# Patient Record
Sex: FEMALE | Race: WHITE | Employment: OTHER | URBAN - NONMETROPOLITAN AREA
[De-identification: names, ages, dates, MRNs, and addresses within clinical notes are randomized per-mention and may not be internally consistent; named-entity substitution may affect disease eponyms.]

---

## 2017-02-23 ENCOUNTER — OFFICE VISIT (OUTPATIENT)
Dept: FAMILY MEDICINE CLINIC | Age: 52
End: 2017-02-23

## 2017-02-23 ENCOUNTER — HOSPITAL ENCOUNTER (OUTPATIENT)
Dept: CT IMAGING | Age: 52
Discharge: HOME | End: 2017-02-23
Payer: COMMERCIAL

## 2017-02-23 VITALS
BODY MASS INDEX: 20.35 KG/M2 | HEART RATE: 80 BPM | WEIGHT: 137.4 LBS | OXYGEN SATURATION: 98 % | SYSTOLIC BLOOD PRESSURE: 142 MMHG | DIASTOLIC BLOOD PRESSURE: 110 MMHG | TEMPERATURE: 98.3 F | HEIGHT: 69 IN

## 2017-02-23 VITALS — WEIGHT: 135 LBS | HEIGHT: 69 IN | BODY MASS INDEX: 19.99 KG/M2

## 2017-02-23 DIAGNOSIS — R10.32 LEFT LOWER QUADRANT PAIN: ICD-10-CM

## 2017-02-23 DIAGNOSIS — Z12.11 COLON CANCER SCREENING: ICD-10-CM

## 2017-02-23 DIAGNOSIS — R10.32 LEFT LOWER QUADRANT PAIN: Primary | ICD-10-CM

## 2017-02-23 DIAGNOSIS — N20.0 KIDNEY STONE ON LEFT SIDE: ICD-10-CM

## 2017-02-23 DIAGNOSIS — Z12.31 SCREENING MAMMOGRAM, ENCOUNTER FOR: ICD-10-CM

## 2017-02-23 LAB
AMYLASE: 105 U/L (ref 28–100)
BASOPHILS ABSOLUTE: 0 K/UL (ref 0–0.2)
BASOPHILS RELATIVE PERCENT: 0.4 %
BILIRUBIN URINE: NEGATIVE
BLOOD, URINE: ABNORMAL
CHOLESTEROL, TOTAL: 225 MG/DL (ref 0–199)
CLARITY: CLEAR
COLOR: YELLOW
EOSINOPHILS ABSOLUTE: 0 K/UL (ref 0–0.7)
EOSINOPHILS RELATIVE PERCENT: 0.1 %
EPITHELIAL CELLS, UA: ABNORMAL /HPF
GLUCOSE URINE: NEGATIVE MG/DL
HCT VFR BLD CALC: 37.9 % (ref 37–47)
HDLC SERPL-MCNC: 92 MG/DL (ref 40–59)
HEMOGLOBIN: 12.9 G/DL (ref 12–16)
KETONES, URINE: NEGATIVE MG/DL
LDL CHOLESTEROL CALCULATED: 119 MG/DL (ref 0–129)
LEUKOCYTE ESTERASE, URINE: NEGATIVE
LIPASE: 38 U/L (ref 13–60)
LYMPHOCYTES ABSOLUTE: 0.8 K/UL (ref 1–4.8)
LYMPHOCYTES RELATIVE PERCENT: 7.8 %
MCH RBC QN AUTO: 30.7 PG (ref 27–31.3)
MCHC RBC AUTO-ENTMCNC: 34.1 % (ref 33–37)
MCV RBC AUTO: 90.3 FL (ref 82–100)
MONOCYTES ABSOLUTE: 1.1 K/UL (ref 0.2–0.8)
MONOCYTES RELATIVE PERCENT: 10.4 %
MUCUS: PRESENT
NEUTROPHILS ABSOLUTE: 8.3 K/UL (ref 1.4–6.5)
NEUTROPHILS RELATIVE PERCENT: 81.3 %
NITRITE, URINE: NEGATIVE
PDW BLD-RTO: 13.3 % (ref 11.5–14.5)
PH UA: 5.5 (ref 5–9)
PLATELET # BLD: 184 K/UL (ref 130–400)
PROTEIN UA: 30 MG/DL
RBC # BLD: 4.2 M/UL (ref 4.2–5.4)
RBC UA: ABNORMAL /HPF (ref 0–2)
SPECIFIC GRAVITY UA: 1.02 (ref 1–1.03)
TRIGL SERPL-MCNC: 71 MG/DL (ref 0–200)
UROBILINOGEN, URINE: 0.2 E.U./DL
WBC # BLD: 10.2 K/UL (ref 4.8–10.8)
WBC UA: ABNORMAL /HPF (ref 0–5)

## 2017-02-23 PROCEDURE — 74177 CT ABD & PELVIS W/CONTRAST: CPT

## 2017-02-23 PROCEDURE — 6360000004 HC RX CONTRAST MEDICATION: Performed by: RADIOLOGY

## 2017-02-23 PROCEDURE — 99214 OFFICE O/P EST MOD 30 MIN: CPT | Performed by: FAMILY MEDICINE

## 2017-02-23 RX ORDER — SODIUM CHLORIDE 0.9 % (FLUSH) 0.9 %
10 SYRINGE (ML) INJECTION
Status: DISPENSED | OUTPATIENT
Start: 2017-02-23 | End: 2017-02-23

## 2017-02-23 RX ORDER — OXYCODONE HYDROCHLORIDE AND ACETAMINOPHEN 5; 325 MG/1; MG/1
1 TABLET ORAL EVERY 6 HOURS PRN
Qty: 30 TABLET | Refills: 0 | Status: SHIPPED | OUTPATIENT
Start: 2017-02-23 | End: 2017-03-02

## 2017-02-23 RX ORDER — ONDANSETRON 4 MG/1
4 TABLET, ORALLY DISINTEGRATING ORAL EVERY 8 HOURS PRN
Qty: 15 TABLET | Refills: 0 | Status: SHIPPED | OUTPATIENT
Start: 2017-02-23 | End: 2021-07-06 | Stop reason: CLARIF

## 2017-02-23 RX ORDER — TAMSULOSIN HYDROCHLORIDE 0.4 MG/1
0.4 CAPSULE ORAL DAILY
Qty: 30 CAPSULE | Refills: 0 | Status: SHIPPED | OUTPATIENT
Start: 2017-02-23 | End: 2021-07-06 | Stop reason: CLARIF

## 2017-02-23 RX ADMIN — IOPAMIDOL 100 ML: 755 INJECTION, SOLUTION INTRAVENOUS at 13:40

## 2017-02-23 ASSESSMENT — ENCOUNTER SYMPTOMS
NAUSEA: 1
VOMITING: 0
CONSTIPATION: 0
HEMATOCHEZIA: 0
DIARRHEA: 0
FLATUS: 0
BELCHING: 1

## 2017-03-01 ENCOUNTER — TELEPHONE (OUTPATIENT)
Dept: FAMILY MEDICINE CLINIC | Age: 52
End: 2017-03-01

## 2017-03-07 ENCOUNTER — OFFICE VISIT (OUTPATIENT)
Dept: UROLOGY | Age: 52
End: 2017-03-07

## 2017-03-07 VITALS
BODY MASS INDEX: 19.7 KG/M2 | HEART RATE: 76 BPM | SYSTOLIC BLOOD PRESSURE: 120 MMHG | WEIGHT: 133 LBS | HEIGHT: 69 IN | DIASTOLIC BLOOD PRESSURE: 78 MMHG

## 2017-03-07 DIAGNOSIS — N20.0 KIDNEY STONES: ICD-10-CM

## 2017-03-07 DIAGNOSIS — N20.0 KIDNEY STONES: Primary | ICD-10-CM

## 2017-03-07 LAB
BILIRUBIN, POC: ABNORMAL
BLOOD URINE, POC: ABNORMAL
CLARITY, POC: CLEAR
COLOR, POC: YELLOW
GLUCOSE URINE, POC: ABNORMAL
KETONES, POC: ABNORMAL
LEUKOCYTE EST, POC: ABNORMAL
NITRITE, POC: ABNORMAL
PH, POC: 5.5
PROTEIN, POC: 30
SPECIFIC GRAVITY, POC: 1.01
UROBILINOGEN, POC: 0.2

## 2017-03-07 PROCEDURE — 81003 URINALYSIS AUTO W/O SCOPE: CPT | Performed by: UROLOGY

## 2017-03-07 PROCEDURE — 99243 OFF/OP CNSLTJ NEW/EST LOW 30: CPT | Performed by: UROLOGY

## 2017-03-09 ENCOUNTER — TELEPHONE (OUTPATIENT)
Dept: FAMILY MEDICINE CLINIC | Age: 52
End: 2017-03-09

## 2017-03-09 LAB
CALCIUM SERPL-MCNC: 9.6 MG/DL (ref 8.4–10.2)
PARATHYROID HORMONE INTACT: 98 PG/ML (ref 15–65)

## 2017-03-16 ENCOUNTER — TELEPHONE (OUTPATIENT)
Dept: UROLOGY | Age: 52
End: 2017-03-16

## 2017-03-17 LAB
CALCIUM 24 HOUR URINE: 43 MG/D
CALCIUM URINE: 1.9 MG/DL
CITRIC ACID, U, 24HR: 250 MG/D (ref 320–1240)
CITRIC ACID, URINE: 110 MG/L
CREATININE 24 HOUR URINE: 842 MG/D (ref 500–1400)
CREATININE URINE: 37 MG/DL
HOURS COLLECTED: 24 HR
OXALATES, URINE 24HR: 20 MG/D (ref 4–31)
OXALATES, URINE: 9 MG/L
URIC ACID 24 HOUR URINE: 323 MG/D (ref 250–750)
URIC ACID, UR: 14.2 MG/DL
URINE TOTAL VOLUME: 2275 ML

## 2017-03-21 ENCOUNTER — TELEPHONE (OUTPATIENT)
Dept: UROLOGY | Age: 52
End: 2017-03-21

## 2017-03-28 ENCOUNTER — OFFICE VISIT (OUTPATIENT)
Dept: UROLOGY | Age: 52
End: 2017-03-28

## 2017-03-28 VITALS
HEIGHT: 69 IN | DIASTOLIC BLOOD PRESSURE: 80 MMHG | SYSTOLIC BLOOD PRESSURE: 115 MMHG | BODY MASS INDEX: 19.26 KG/M2 | HEART RATE: 64 BPM | WEIGHT: 130 LBS

## 2017-03-28 DIAGNOSIS — N20.0 RENAL CALCULI: Primary | ICD-10-CM

## 2017-03-28 PROCEDURE — 99214 OFFICE O/P EST MOD 30 MIN: CPT | Performed by: UROLOGY

## 2017-03-31 ENCOUNTER — HOSPITAL ENCOUNTER (OUTPATIENT)
Dept: GENERAL RADIOLOGY | Age: 52
Discharge: HOME | End: 2017-03-31
Payer: COMMERCIAL

## 2017-03-31 ENCOUNTER — HOSPITAL ENCOUNTER (OUTPATIENT)
Dept: CT IMAGING | Age: 52
Discharge: HOME | End: 2017-03-31
Payer: COMMERCIAL

## 2017-03-31 VITALS
HEART RATE: 72 BPM | HEIGHT: 69 IN | SYSTOLIC BLOOD PRESSURE: 111 MMHG | DIASTOLIC BLOOD PRESSURE: 75 MMHG | WEIGHT: 130 LBS | RESPIRATION RATE: 16 BRPM | BODY MASS INDEX: 19.26 KG/M2

## 2017-03-31 DIAGNOSIS — N20.0 RENAL CALCULI: ICD-10-CM

## 2017-03-31 PROCEDURE — 74000 XR ABDOMEN LIMITED (KUB): CPT

## 2017-03-31 PROCEDURE — 74150 CT ABDOMEN W/O CONTRAST: CPT

## 2017-04-04 ENCOUNTER — TELEPHONE (OUTPATIENT)
Dept: UROLOGY | Age: 52
End: 2017-04-04

## 2017-04-14 ENCOUNTER — OFFICE VISIT (OUTPATIENT)
Dept: SURGERY | Age: 52
End: 2017-04-14

## 2017-04-14 VITALS
HEIGHT: 69 IN | HEART RATE: 72 BPM | BODY MASS INDEX: 19.7 KG/M2 | WEIGHT: 133 LBS | SYSTOLIC BLOOD PRESSURE: 112 MMHG | DIASTOLIC BLOOD PRESSURE: 78 MMHG

## 2017-04-14 DIAGNOSIS — E21.3 HYPERPARATHYROIDISM (HCC): ICD-10-CM

## 2017-04-14 DIAGNOSIS — E55.9 VITAMIN D DEFICIENCY: ICD-10-CM

## 2017-04-14 DIAGNOSIS — E21.1 HYPERPARATHYROIDISM DUE TO VITAMIN D DEFICIENCY (HCC): ICD-10-CM

## 2017-04-14 DIAGNOSIS — E21.3 HYPERPARATHYROIDISM (HCC): Primary | ICD-10-CM

## 2017-04-14 LAB
ANION GAP SERPL CALCULATED.3IONS-SCNC: 13 MEQ/L (ref 7–13)
BUN BLDV-MCNC: 30 MG/DL (ref 6–20)
CALCIUM SERPL-MCNC: 9.5 MG/DL (ref 8.6–10.2)
CHLORIDE BLD-SCNC: 102 MEQ/L (ref 98–107)
CO2: 23 MEQ/L (ref 22–29)
CREAT SERPL-MCNC: 1.1 MG/DL (ref 0.5–0.9)
GFR AFRICAN AMERICAN: >60
GFR NON-AFRICAN AMERICAN: 51.7
GLUCOSE BLD-MCNC: 108 MG/DL (ref 74–109)
PARATHYROID HORMONE INTACT: 111.5 PG/ML (ref 15–65)
POTASSIUM SERPL-SCNC: 3.7 MEQ/L (ref 3.5–5.1)
SODIUM BLD-SCNC: 138 MEQ/L (ref 132–144)
VITAMIN D 25-HYDROXY: 24.3 NG/ML (ref 30–100)

## 2017-04-14 PROCEDURE — 99202 OFFICE O/P NEW SF 15 MIN: CPT | Performed by: INTERNAL MEDICINE

## 2017-06-01 ENCOUNTER — TELEPHONE (OUTPATIENT)
Dept: UROLOGY | Age: 52
End: 2017-06-01

## 2017-06-01 DIAGNOSIS — N20.0 KIDNEY STONE ON LEFT SIDE: Primary | ICD-10-CM

## 2019-04-10 ENCOUNTER — HOSPITAL ENCOUNTER (OUTPATIENT)
Dept: PHYSICAL THERAPY | Age: 54
Setting detail: THERAPIES SERIES
Discharge: HOME | End: 2019-04-10
Payer: COMMERCIAL

## 2019-04-10 PROCEDURE — 97162 PT EVAL MOD COMPLEX 30 MIN: CPT

## 2019-04-10 PROCEDURE — 97110 THERAPEUTIC EXERCISES: CPT

## 2019-04-10 PROCEDURE — 97140 MANUAL THERAPY 1/> REGIONS: CPT

## 2019-04-10 ASSESSMENT — PAIN DESCRIPTION - FREQUENCY: FREQUENCY: INTERMITTENT

## 2019-04-10 ASSESSMENT — PAIN DESCRIPTION - PROGRESSION: CLINICAL_PROGRESSION: GRADUALLY WORSENING

## 2019-04-10 ASSESSMENT — PAIN DESCRIPTION - ONSET: ONSET: PROGRESSIVE

## 2019-04-10 ASSESSMENT — PAIN - FUNCTIONAL ASSESSMENT: PAIN_FUNCTIONAL_ASSESSMENT: PREVENTS OR INTERFERES WITH MANY ACTIVE NOT PASSIVE ACTIVITIES

## 2019-04-10 ASSESSMENT — PAIN DESCRIPTION - DESCRIPTORS: DESCRIPTORS: ACHING

## 2019-04-10 ASSESSMENT — PAIN DESCRIPTION - PAIN TYPE: TYPE: CHRONIC PAIN

## 2019-04-10 ASSESSMENT — PAIN DESCRIPTION - LOCATION: LOCATION: BACK;LEG;BUTTOCKS

## 2019-04-10 ASSESSMENT — PAIN SCALES - GENERAL: PAINLEVEL_OUTOF10: 5

## 2019-04-10 ASSESSMENT — PAIN DESCRIPTION - ORIENTATION: ORIENTATION: RIGHT

## 2019-04-10 NOTE — PROGRESS NOTES
[address]  PHYSICAL THERAPY EVALUATION    Date: 2019  Patient Name: Yamilka Perdomo       MRN: [id number]   Account: [de-identified]   : 1965  (62 y.o.)   Gender: female   Referring Practitioner: Bassam Marrero DO                 Diagnosis: R sciatica  Treatment Diagnosis: back pain, R hip pain, R leg pain, impaired lumbar and hip ROM, impaired R LE strength and flexibility          Past Medical History:  has a past medical history of Abdominal pain, Hypertension, Kidney stone on left side, and Postmenopausal.   Past Surgical History:   has no past surgical history on file. Vital Signs  Patient Currently in Pain: Yes   Pain Screening  Patient Currently in Pain: Yes  Pain Assessment  Pain Assessment: 0-10  Pain Level: 5(Worst 10/10; Best 5/10)  Pain Type: Chronic pain  Pain Location: Back;Leg;Buttocks  Pain Orientation: Right  Pain Descriptors: Aching  Pain Frequency: Intermittent  Pain Onset: Progressive  Clinical Progression: Gradually worsening  Functional Pain Assessment: Prevents or interferes with many active not passive activities  Non-Pharmaceutical Pain Intervention(s): Rest;Shower                Lives With: (mother)  Type of Home: House  Home Layout: One level  Home Access: Stairs to enter with rails  Entrance Stairs - Number of Steps: 3  ADL Assistance: Independent  Homemaking Assistance: Independent  Homemaking Responsibilities: Yes  Ambulation Assistance: Independent  Transfer Assistance: Independent  Active : Yes    Objective:   Sensation  Overall Sensation Status: WFL    Bed Mobility  Bridging: Independent  Scooting: Independent     Transfers  Sit to Stand: Independent  Stand to sit:  Independent    Strength RLE  Strength RLE: Exception  Comment: hip 3+/5, quad 4/5, HS 4-/5, ankle WNL  Strength LLE  Comment: hip 4-/5, quad 4+/5, HS 4/5, ankle WNL        Strength Other  Other: core 1/5 (doubleleg lowering)    PROM RLE (degrees)  RLE General PROM: SLS 49  AROM RLE (degrees)  RLE AROM: WFL  RLE General AROM: IR 0-12, ER 0-15  PROM LLE (degrees)  LLE General PROM: SLR 65  AROM LLE (degrees)  LLE AROM : WFL           Observation/Palpation  Posture: Fair  Palpation: R sided paraspinal & QL tenderness  Observation: increased lower thoracic kyphosis, decreased lumbar lordosis, minor at TL junction, L iliac crest elevated, R PSIS lower, decreased R PSIS mobility in flexion and stork  Bed mobility  Bridging: Independent  Scooting: Independent          Additional Measures  Flexibility: impaired R HS, hip flexor, piriformis  Special Tests: SLR (-), slump (-), SI compression (-), point tenderness over R SI with significant pelvis assymetry         Exercises:   Exercises  Exercise 1: Assess pelvis: R PSIS lower, R iliac crest posteriorly rotated in flex and stork  Exercise 2: MET: R flexion, L extension; hip abd/add 5\"x5 with exhale;   Exercise 3: lumbar flexion over p ball 3 ways 10\"x5  Exercise 4: roll for control*  Exercise 5: H/l hip abd/add ball/TB*  Exercise 6: TA with breath*  Exercise 7: TA H/L knee drop outs*  Exercise 8: DLS*  Exercise 9: LTR*  Exercise 10: DKTC*    Modalities:  Modalities  Moist heat: x10 to low back to decrease mm tightness/spasms/pain  Manual:  Manual therapy  Muscle energy: See exercise for details  Manual traction: R LE long axis traction for pain relief   Soft Tissue Mobalization: lumbar paraspinals, QL, glute, piriformis  *Indicates exercise,modality, or manual techniques to be initiated when appropriate  Assessment: Body structures, Functions, Activity limitations: Decreased ROM, Decreased strength, Increased Pain, Decreased high-level IADLs  Assessment: Pt presents with chronic gradual onset of back pain, R hip pain, R leg pain, impaired lumbar and hip ROM, impaired R LE strength and flexibility. Pt would benefit from physical therapy to address above deficits, decrease pain, restore function, address pt's goals, and improve QOL. Prognosis: Good        Decision Making: Medium Complexity  History: med  Exam: med  Clinical Presentation: med        Plan  Frequency/Duration:  Plan  Times per week: 2  Plan weeks: 4-6  Current Treatment Recommendations: ROM, Manual Therapy - Joint Manipulation, Cognitive Reorientation, Pain Management, Modalities, Equipment Evaluation, Education, & procurement, Patient/Caregiver Education & Training, Positioning, Home Exercise Program, Safety Education & Training, Manual Therapy - Soft Tissue Mobilization, Functional Mobility Training, Neuromuscular Re-education       POST-PAIN     Pain Rating (0-10 pain scale):  5 /10  Location and pain description same as pre-treatment unless indicated. Action: [] NA  [] Call Physician  [] Perform HEP  [] Meds as prescribed    Evaluation and patient rights have been reviewed and patient agrees with plan of care. Yes  [x]  No  []   Explain:       [place] Fall Risk Assessment  Risk Factor Scale  Score   History of Falls [] Yes  [] No 25  0 0   Secondary Diagnosis [] Yes  [] No 15  0 0   Ambulatory Aid [] Furniture  [] Crutches/cane/walker  [] None/bedrest/wheelchair/nurse 30  15  0 0   IV/Heparin Lock [] Yes  [] No 20  0 0   Gait/Transferring [] Impaired  [] Weak  [] Normal/bedrest/immobile 20  10  0 0   Mental Status [] Forgets limitations  [] Oriented to own ability 15  0 0      0     Based on the Assessment score: check the appropriate box. [x]  No intervention needed   Low =   Score of 0-24  []  Use standard prevention interventions Moderate =  Score of 24-44   [] Discuss fall prevention strategies   [] Indicate moderate falls risk on eval  []  Use high risk prevention interventions High = Score of 45 and higher   [] Discuss fall prevention strategies   [] Provide supervision during treatment time    Goals  Long term goals  Time Frame for Long term goals : 4-6  Long term goal 1: Pt will be indep and compliant with HEP to manage symtoms.   Long term goal 2: Pt will increase lumbar AROM to WNL and R hip AROM >10 degrees to facilitate improved ability to perform daily tasks with decreased pain. Long term goal 3: Pt will increase R hip and HS strength to >4+/5 and core strength to >3/5 (double leg lowering) to improve functional mobility  Long term goal 4: Pt will improve LEFS to >/= 49/50 to demonstrate improved  functional activity tolerance. Long term goal 5: Pt will decrease pain with functional activities <2-3/10 to improve QOL.           PT Individual Minutes  Time In: 1500  Time Out: 4296  Minutes: 59  Timed Code Treatment Minutes: 24 Minutes  Procedure Minutes: 35  Electronically signed by Rebeca Gary PT on 4/10/19 at 4:12 PM

## 2019-04-11 NOTE — PROGRESS NOTES
[facility], [address]     Ph: [phone]  Fax: [phone]    [] Certification  [] Recertification [x]  Plan of Care  [] Progress Note [] Discharge      To:  Referring Practitioner: Callie Conte DO      From:  Cheng Rangel, PT  Patient: Katarzyna Morgan     : 1965  Diagnosis: R sciatica     Date: 2019  Treatment Diagnosis: back pain, R hip pain, R leg pain, impaired lumbar and hip ROM, impaired R LE strength and flexibility       Progress Report Period from:  2019  to 2019    Total # of Visits to Date: 1   No Show: 0    Canceled Appointment: 0     OBJECTIVE:   Long Term Goals - Time Frame for Long term goals : 4-6  Goals Current/ Discharge status Met   Long term goal 1: Pt will be indep and compliant with HEP to manage symtoms. To be established [] yes  [] no   Long term goal 2: Pt will increase lumbar AROM to WNL and R hip AROM >10 degrees to facilitate improved ability to perform daily tasks with decreased pain. PROM RLE (degrees)  RLE General PROM: SLS 49  AROM RLE (degrees)  RLE AROM: WFL  RLE General AROM: IR 0-12, ER 0-15  PROM LLE (degrees)  LLE General PROM: SLR 65  AROM LLE (degrees)  LLE AROM : WFL    [] yes  [] no   Long term goal 3: Pt will increase R hip and HS strength to >4+/5 and core strength to >3/5 (double leg lowering) to improve functional mobility Strength RLE  Strength RLE: Exception  Comment: hip 3+/5, quad 4/5, HS 4-/5, ankle WNL  Strength LLE  Comment: hip 4-/5, quad 4+/5, HS 4/5, ankle WNL        Strength Other  Other: core 1/5 (doubleleg lowering)   [] yes  [] no   Long term goal 4: Pt will improve LEFS to >/= 49/50 to demonstrate improved  functional activity tolerance.  [] yes  [] no   Long term goal 5: Pt will decrease pain with functional activities <2-3/10 to improve QOL.   5/10 today [] yes  [] no        Body structures, Functions, Activity limitations:

## 2019-04-15 ENCOUNTER — HOSPITAL ENCOUNTER (OUTPATIENT)
Dept: PHYSICAL THERAPY | Age: 54
Setting detail: THERAPIES SERIES
Discharge: HOME | End: 2019-04-15
Payer: COMMERCIAL

## 2019-04-15 PROCEDURE — 97110 THERAPEUTIC EXERCISES: CPT

## 2019-04-15 PROCEDURE — 97140 MANUAL THERAPY 1/> REGIONS: CPT

## 2019-04-15 ASSESSMENT — PAIN DESCRIPTION - PROGRESSION: CLINICAL_PROGRESSION: GRADUALLY WORSENING

## 2019-04-15 ASSESSMENT — PAIN DESCRIPTION - LOCATION: LOCATION: LEG

## 2019-04-15 ASSESSMENT — PAIN DESCRIPTION - ORIENTATION: ORIENTATION: RIGHT;POSTERIOR

## 2019-04-15 ASSESSMENT — PAIN SCALES - GENERAL: PAINLEVEL_OUTOF10: 6

## 2019-04-15 NOTE — PROGRESS NOTES
symtpoms with RLE long axis distraction. Notes mildly decreased pain post tx. Treatment Diagnosis: back pain, R hip pain, R leg pain, impaired lumbar and hip ROM, impaired R LE strength and flexibility  Prognosis: Good     Goals:  Long term goals  Time Frame for Long term goals : 4-6  Long term goal 1: Pt will be indep and compliant with HEP to manage symtoms. Long term goal 2: Pt will increase lumbar AROM to WNL and R hip AROM >10 degrees to facilitate improved ability to perform daily tasks with decreased pain. Long term goal 3: Pt will increase R hip and HS strength to >4+/5 and core strength to >3/5 (double leg lowering) to improve functional mobility  Long term goal 4: Pt will improve LEFS to >/= 49/50 to demonstrate improved  functional activity tolerance. Long term goal 5: Pt will decrease pain with functional activities <2-3/10 to improve QOL. Progress toward goals: progressing towards all    POST-PAIN       Pain Rating (0-10 pain scale):  4 /10   Location and pain description same as pre-treatment unless indicated. Action: [] NA   [x] Perform HEP  [] Meds as prescribed  [] Modalities as prescribed   [] Call Physician     Frequency/Duration:  Plan  Times per week: 2  Plan weeks: 4-6  Current Treatment Recommendations: ROM, Manual Therapy - Joint Manipulation, Cognitive Reorientation, Pain Management, Modalities, Equipment Evaluation, Education, & procurement, Patient/Caregiver Education & Training, Positioning, Home Exercise Program, Safety Education & Training, Manual Therapy - Soft Tissue Mobilization, Functional Mobility Training, Neuromuscular Re-education     Pt to continue current HEP. See objective section for any therapeutic exercise changes, additions or modifications this date.          PT Individual Minutes  Time In: 9451  Time Out: 7041  Minutes: 48  Timed Code Treatment Minutes: 38 Minutes  Procedure Minutes: 10    Activity Minutes Units   Ther Ex 30 2   Manual  8 1   Moist heat 10 Signature:  Electronically signed by Gabriel Flowers PTA on 4/15/19 at 11:52 AM

## 2019-04-17 ENCOUNTER — HOSPITAL ENCOUNTER (OUTPATIENT)
Dept: PHYSICAL THERAPY | Age: 54
Setting detail: THERAPIES SERIES
Discharge: HOME | End: 2019-04-17
Payer: COMMERCIAL

## 2019-04-17 PROCEDURE — 97110 THERAPEUTIC EXERCISES: CPT

## 2019-04-17 PROCEDURE — 97140 MANUAL THERAPY 1/> REGIONS: CPT

## 2019-04-17 ASSESSMENT — PAIN DESCRIPTION - DESCRIPTORS: DESCRIPTORS: ACHING

## 2019-04-17 ASSESSMENT — PAIN DESCRIPTION - LOCATION: LOCATION: LEG

## 2019-04-17 ASSESSMENT — PAIN SCALES - GENERAL: PAINLEVEL_OUTOF10: 6

## 2019-04-17 ASSESSMENT — PAIN DESCRIPTION - PAIN TYPE: TYPE: CHRONIC PAIN

## 2019-04-17 ASSESSMENT — PAIN DESCRIPTION - ORIENTATION: ORIENTATION: RIGHT;POSTERIOR

## 2019-04-17 NOTE — PROGRESS NOTES
significant RLE shorter than LLE. In H/L position, R femur noted to be short compared to L. Progressed core ex's this date w/o incident. Pt reports the tennis ball massage felt great and reported decrease pain after to 4/10. Concluded w/ MHP to further dec pain and mm tightness. Treatment Diagnosis: back pain, R hip pain, R leg pain, impaired lumbar and hip ROM, impaired R LE strength and flexibility  Prognosis: Good     Goals:  Long term goals  Time Frame for Long term goals : 4-6  Long term goal 1: Pt will be indep and compliant with HEP to manage symtoms. Long term goal 2: Pt will increase lumbar AROM to WNL and R hip AROM >10 degrees to facilitate improved ability to perform daily tasks with decreased pain. Long term goal 3: Pt will increase R hip and HS strength to >4+/5 and core strength to >3/5 (double leg lowering) to improve functional mobility  Long term goal 4: Pt will improve LEFS to >/= 49/50 to demonstrate improved  functional activity tolerance. Long term goal 5: Pt will decrease pain with functional activities <2-3/10 to improve QOL. Progress toward goals: inc strength, rom, dec pain    POST-PAIN       Pain Rating (0-10 pain scale):   2-3/10   Location and pain description same as pre-treatment unless indicated. Action: [] NA   [x] Perform HEP  [] Meds as prescribed  [] Modalities as prescribed   [] Call Physician     Frequency/Duration:  Plan  Times per week: 2  Plan weeks: 4-6  Current Treatment Recommendations: ROM, Manual Therapy - Joint Manipulation, Cognitive Reorientation, Pain Management, Modalities, Equipment Evaluation, Education, & procurement, Patient/Caregiver Education & Training, Positioning, Home Exercise Program, Safety Education & Training, Manual Therapy - Soft Tissue Mobilization, Functional Mobility Training, Neuromuscular Re-education     Pt to continue current HEP. See objective section for any therapeutic exercise changes, additions or modifications this date.     PT Individual Minutes  Time In: 9777  Time Out: 1550  Minutes: 48  Timed Code Treatment Minutes: 38 Minutes  Procedure Minutes:10    Signature:  Electronically signed by Nidia Basurto PTA on 4/17/19 at 3:11 PM

## 2019-04-22 ENCOUNTER — HOSPITAL ENCOUNTER (OUTPATIENT)
Dept: PHYSICAL THERAPY | Age: 54
Setting detail: THERAPIES SERIES
Discharge: HOME | End: 2019-04-22
Payer: COMMERCIAL

## 2019-04-22 PROCEDURE — 97140 MANUAL THERAPY 1/> REGIONS: CPT

## 2019-04-22 PROCEDURE — 97110 THERAPEUTIC EXERCISES: CPT

## 2019-04-22 ASSESSMENT — PAIN SCALES - GENERAL: PAINLEVEL_OUTOF10: 5

## 2019-04-22 ASSESSMENT — PAIN DESCRIPTION - ORIENTATION: ORIENTATION: RIGHT

## 2019-04-22 ASSESSMENT — PAIN DESCRIPTION - DESCRIPTORS: DESCRIPTORS: SHARP

## 2019-04-24 ENCOUNTER — HOSPITAL ENCOUNTER (OUTPATIENT)
Dept: PHYSICAL THERAPY | Age: 54
Setting detail: THERAPIES SERIES
Discharge: HOME | End: 2019-04-24
Payer: COMMERCIAL

## 2019-04-24 PROCEDURE — 97140 MANUAL THERAPY 1/> REGIONS: CPT

## 2019-04-24 PROCEDURE — 97110 THERAPEUTIC EXERCISES: CPT

## 2019-04-24 ASSESSMENT — PAIN DESCRIPTION - ORIENTATION: ORIENTATION: RIGHT

## 2019-04-24 ASSESSMENT — PAIN SCALES - GENERAL: PAINLEVEL_OUTOF10: 6

## 2019-04-24 ASSESSMENT — PAIN DESCRIPTION - DESCRIPTORS: DESCRIPTORS: TIGHTNESS;SORE

## 2019-04-24 ASSESSMENT — PAIN DESCRIPTION - PAIN TYPE: TYPE: CHRONIC PAIN

## 2019-04-24 NOTE — PROGRESS NOTES
[id number] [address]  Outpatient Physical Therapy    Treatment Note        Date: 2019  Patient: Sidney Ledezma  : 1965  ACCT #: [de-identified]  Referring Practitioner: Papito Mendiola DO  Diagnosis: R sciatica    Visit Information:  PT Visit Information  Onset Date: 04/10/19  PT Insurance Information: [address]  Total # of Visits Approved: 20  Total # of Visits to Date: 5  No Show: 0  Canceled Appointment: 0  Progress Note Counter: 5/8-10    Subjective: Pt reports the LBP has been minimal, however the R calf pain has increased. Pt states she tried to massage it a little at home. Comments: RTD? HEP Compliance:  [x] Good [] Fair [] Poor [] Reports not doing due to:    Vital Signs  Patient Currently in Pain: Yes   Pain Screening  Patient Currently in Pain: Yes  Pain Assessment  Pain Assessment: 0-10  Pain Level: 6  Pain Type: Chronic pain  Pain Location: (calf)  Pain Orientation: Right  Pain Descriptors: Tightness; Sore    OBJECTIVE:   Exercises  Exercise 1: Assess pelvis: RLE short, however grossly symmetric pelvis  Exercise 6: TA with breath 5s x 20  Exercise 7: TA H/L knee drop outs 3s x 20  Exercise 8: DLS 2-way (march and opp UE/LE) x20 ea  Exercise 12: Clamshells x15 b/l  Exercise 13: seated calf stretch w/ strap 3x20'' RLE  Exercise 14: standing calf stretch at step R 3x20''  Exercise 15: TA SLR x10  Exercise 16: TA iso bridge 3''x10    Strength: [x] NT  [] MMT completed:    ROM: [x] NT  [] ROM measurements:     Manual:   Manual therapy  Soft Tissue Mobalization: Right side lumbar paraspinals, QL, glute, piriformis with tennis ball, manual R gastroc stretch, STM/TPR to R gastroc/soleus x15 min    Modalities:  Modalities  Moist heat: x10 to low back to decrease mm tightness/spasms/pain     *Indicates exercise, modality, or manual techniques to be initiated when appropriate    Assessment:    Body structures, Functions, Activity limitations: Decreased ROM, Decreased strength, Increased Pain, Decreased high-level IADLs  Assessment: Pt cont to demo symmetrical pelvis, however RLE still short. Initiated calf stretching and manual to dec pain and mm tightness. Also added TA SLR and bridging iso for improved strength and stability. Cont'd good response to manual.  Treatment Diagnosis: back pain, R hip pain, R leg pain, impaired lumbar and hip ROM, impaired R LE strength and flexibility  Prognosis: Good       Goals:  Long term goals  Time Frame for Long term goals : 4-6  Long term goal 1: Pt will be indep and compliant with HEP to manage symtoms. Long term goal 2: Pt will increase lumbar AROM to WNL and R hip AROM >10 degrees to facilitate improved ability to perform daily tasks with decreased pain. Long term goal 3: Pt will increase R hip and HS strength to >4+/5 and core strength to >3/5 (double leg lowering) to improve functional mobility  Long term goal 4: Pt will improve LEFS to >/= 49/50 to demonstrate improved  functional activity tolerance. Long term goal 5: Pt will decrease pain with functional activities <2-3/10 to improve QOL. Progress toward goals: inc strength, rom, dec pain    POST-PAIN       Pain Rating (0-10 pain scale):   0/10   Location and pain description same as pre-treatment unless indicated. Action: [] NA   [x] Perform HEP  [] Meds as prescribed  [] Modalities as prescribed   [] Call Physician     Frequency/Duration:  Plan  Times per week: 2  Plan weeks: 4-6  Current Treatment Recommendations: ROM, Manual Therapy - Joint Manipulation, Cognitive Reorientation, Pain Management, Modalities, Equipment Evaluation, Education, & procurement, Patient/Caregiver Education & Training, Positioning, Home Exercise Program, Safety Education & Training, Manual Therapy - Soft Tissue Mobilization, Functional Mobility Training, Neuromuscular Re-education     Pt to continue current HEP. See objective section for any therapeutic exercise changes, additions or modifications this date.     PT Individual Minutes  Time In: 7346  Time Out: 9708  Minutes: 50  Timed Code Treatment Minutes: 40 Minutes  Procedure Minutes:10    Signature:  Electronically signed by Carlos Solorzano PTA on 4/24/19 at 1:35 PM

## 2019-04-29 ENCOUNTER — HOSPITAL ENCOUNTER (OUTPATIENT)
Dept: PHYSICAL THERAPY | Age: 54
Setting detail: THERAPIES SERIES
Discharge: HOME | End: 2019-04-29
Payer: COMMERCIAL

## 2019-04-29 PROCEDURE — 97110 THERAPEUTIC EXERCISES: CPT

## 2019-04-29 PROCEDURE — 97140 MANUAL THERAPY 1/> REGIONS: CPT

## 2019-04-29 ASSESSMENT — PAIN DESCRIPTION - DESCRIPTORS: DESCRIPTORS: TIGHTNESS;SORE

## 2019-04-29 ASSESSMENT — PAIN SCALES - GENERAL
PAINLEVEL_OUTOF10: 5
PAINLEVEL_OUTOF10: 5

## 2019-04-29 ASSESSMENT — PAIN DESCRIPTION - PAIN TYPE
TYPE: CHRONIC PAIN
TYPE: CHRONIC PAIN

## 2019-04-29 ASSESSMENT — PAIN DESCRIPTION - FREQUENCY: FREQUENCY: INTERMITTENT

## 2019-04-29 NOTE — PROGRESS NOTES
[address] [address]  Outpatient Physical Therapy    Treatment Note        Date: 2019  Patient: Jarrod Winkler  : 1965  ACCT #: [de-identified]  Referring Practitioner: Mackenzie Wilkes DO  Diagnosis: R sciatica    Visit Information:  PT Visit Information  Onset Date: 04/10/19  PT Insurance Information: [address]  Total # of Visits Approved: 20  Total # of Visits to Date: 6  No Show: 0  Canceled Appointment: 0  Progress Note Counter: -10    Subjective: pain today is 5/10, LB and right LE  Comments: RTD? HEP Compliance:  [x] Good [] Fair [] Poor [] Reports not doing due to:    Vital Signs  Patient Currently in Pain: Yes   Pain Screening  Patient Currently in Pain: Yes  Pain Assessment  Pain Assessment: 0-10  Pain Level: 5  Pain Type: Chronic pain  Pain Descriptors: Tightness; Sore  Pain Frequency: Intermittent    OBJECTIVE:   Exercises  Exercise 1: Assess pelvis: RLE short, however grossly symmetric pelvis  Exercise 6: TA with breath 5s x 20  Exercise 7: TA H/L knee drop outs 3s x 20  Exercise 8: DLS 2-way (march and opp UE/LE) x20 ea  Exercise 12: Clamshells x15 b/l  Exercise 13: seated calf stretch w/ strap 3x20'' RLE  Exercise 14: standing calf stretch at step R 3x20''  Exercise 15: TA SLR x10  Exercise 16: TA iso bridge 3''x10  Manual:   Manual therapy  Soft Tissue Mobalization: Right side lumbar paraspinals, QL, glute, piriformis with tennis ball, manual R gastroc stretch, STM/TPR to R gastroc/soleus x15 min    Modalities:  Modalities  Moist heat: x10 to low back to decrease mm tightness/spasms/pain     *Indicates exercise, modality, or manual techniques to be initiated when appropriate    Assessment: Body structures, Functions, Activity limitations: Decreased ROM, Decreased strength, Increased Pain, Decreased high-level IADLs  Assessment: patient with pelvic assymetries this date- left anterior rotation, unchanged with METS. Continues to demo sighnificant Right LLD.  good tolerance to tx however unable to maintain neutral pelvis with DLS. Treatment Diagnosis: back pain, R hip pain, R leg pain, impaired lumbar and hip ROM, impaired R LE strength and flexibility  Prognosis: Good       Goals:       Long term goals  Time Frame for Long term goals : 4-6  Long term goal 1: Pt will be indep and compliant with HEP to manage symtoms. Long term goal 2: Pt will increase lumbar AROM to WNL and R hip AROM >10 degrees to facilitate improved ability to perform daily tasks with decreased pain. Long term goal 3: Pt will increase R hip and HS strength to >4+/5 and core strength to >3/5 (double leg lowering) to improve functional mobility  Long term goal 4: Pt will improve LEFS to >/= 49/50 to demonstrate improved  functional activity tolerance. Long term goal 5: Pt will decrease pain with functional activities <2-3/10 to improve QOL. Progress toward goals: continue towards all     POST-PAIN       Pain Rating (0-10 pain scale):  1-2 /10   Location and pain description same as pre-treatment unless indicated. Action: [] NA   [] Perform HEP  [] Meds as prescribed  [] Modalities as prescribed   [] Call Physician     Frequency/Duration:  Plan  Times per week: 2  Plan weeks: 4-6  Current Treatment Recommendations: ROM, Manual Therapy - Joint Manipulation, Cognitive Reorientation, Pain Management, Modalities, Equipment Evaluation, Education, & procurement, Patient/Caregiver Education & Training, Positioning, Home Exercise Program, Safety Education & Training, Manual Therapy - Soft Tissue Mobilization, Functional Mobility Training, Neuromuscular Re-education     Pt to continue current HEP. See objective section for any therapeutic exercise changes, additions or modifications this date.          PT Individual Minutes  Time In: 8687  Time Out: 1520  Minutes: 55  Timed Code Treatment Minutes: 45 Minutes  Procedure Minutes:10    Signature:  Electronically signed by Juan Carlos Joseph PTA on 4/29/19 at 3:28 PM

## 2019-05-01 ENCOUNTER — HOSPITAL ENCOUNTER (OUTPATIENT)
Dept: PHYSICAL THERAPY | Age: 54
Setting detail: THERAPIES SERIES
Discharge: HOME | End: 2019-05-01
Payer: COMMERCIAL

## 2019-05-01 PROCEDURE — 97110 THERAPEUTIC EXERCISES: CPT

## 2019-05-01 ASSESSMENT — PAIN DESCRIPTION - DESCRIPTORS: DESCRIPTORS: TIGHTNESS;SORE

## 2019-05-01 ASSESSMENT — PAIN SCALES - GENERAL: PAINLEVEL_OUTOF10: 5

## 2019-05-01 ASSESSMENT — PAIN DESCRIPTION - ORIENTATION: ORIENTATION: RIGHT

## 2019-05-01 ASSESSMENT — PAIN DESCRIPTION - PAIN TYPE: TYPE: CHRONIC PAIN

## 2019-05-01 NOTE — PROGRESS NOTES
[address] [address]  Outpatient Physical Therapy    Treatment Note        Date: 2019  Patient: Omar Ortiz  : 1965  ACCT #: [de-identified]  Referring Practitioner: Jessica Holm DO  Diagnosis: R sciatica    Visit Information:  PT Visit Information  Onset Date: 04/10/19  PT Insurance Information: [employer]  Total # of Visits Approved: 20  Total # of Visits to Date: 7  No Show: 0  Canceled Appointment: 0  Progress Note Counter: 7/8-10    Subjective: Pt reports pain in LB is 0/10, just fatigued. Pt reports cont'd tightness in R calf, though states not constant. Comments: RTD? HEP Compliance:  [x] Good [] Fair [] Poor [] Reports not doing due to:    Vital Signs  Patient Currently in Pain: Other (comment)(no pain currently - 4-5/10 when present )   Pain Screening  Patient Currently in Pain: Other (comment)(no pain currently - 4-5/10 when present )  Pain Assessment  Pain Assessment: 0-10  Pain Level: 5  Pain Type: Chronic pain  Pain Location: (Calf)  Pain Orientation: Right  Pain Descriptors: Tightness; Sore    OBJECTIVE:   Exercises  Exercise 1: Assess pelvis: RLE short, however grossly symmetric pelvis. Had Cresenciano Chute, PT assess bony landmarks of knee, fibular head sits posteriorly, though symmetrical on both sides. Exercise 6: TA with breath 5s x 20  Exercise 7: TA H/L knee drop outs 3s x 20  Exercise 15: TA SLR x12  Exercise 16: TA iso bridge 3''x12  Exercise 17: hip abd S/L x10 b/l  Exercise 18: pball TA isos, DLS F42 ea  Exercise 20: HEP: TA iso, knee fallouts, SLR, hip abd, hip ext, bridges, pball ex's    Strength: [x] NT  [] MMT completed:    ROM: [x] NT  [] ROM measurements:     Modalities:  Modalities  Moist heat: x10 to low back to decrease mm tightness/spasms/pain     *Indicates exercise, modality, or manual techniques to be initiated when appropriate    Assessment:    Body structures, Functions, Activity limitations: Decreased ROM, Decreased strength, Increased Pain, Decreased high-level IADLs  Assessment: Pt demo's good pelvic symmetry this date, cont's to have RLE LLD. Progressed core strengthening and HEP w/o incident. Frequent UE support needed for pball ex's. Treatment Diagnosis: back pain, R hip pain, R leg pain, impaired lumbar and hip ROM, impaired R LE strength and flexibility  Prognosis: Good       Goals:  Long term goals  Time Frame for Long term goals : 4-6  Long term goal 1: Pt will be indep and compliant with HEP to manage symtoms. Long term goal 2: Pt will increase lumbar AROM to WNL and R hip AROM >10 degrees to facilitate improved ability to perform daily tasks with decreased pain. Long term goal 3: Pt will increase R hip and HS strength to >4+/5 and core strength to >3/5 (double leg lowering) to improve functional mobility  Long term goal 4: Pt will improve LEFS to >/= 49/50 to demonstrate improved  functional activity tolerance. Long term goal 5: Pt will decrease pain with functional activities <2-3/10 to improve QOL. Progress toward goals: inc strength, rom, dec pain    POST-PAIN       Pain Rating (0-10 pain scale):   0/10   Location and pain description same as pre-treatment unless indicated. Action: [] NA   [x] Perform HEP  [] Meds as prescribed  [] Modalities as prescribed   [] Call Physician     Frequency/Duration:  Plan  Times per week: 2  Plan weeks: 4-6  Current Treatment Recommendations: ROM, Manual Therapy - Joint Manipulation, Cognitive Reorientation, Pain Management, Modalities, Equipment Evaluation, Education, & procurement, Patient/Caregiver Education & Training, Positioning, Home Exercise Program, Safety Education & Training, Manual Therapy - Soft Tissue Mobilization, Functional Mobility Training, Neuromuscular Re-education  Plan Comment: Anticipate D/C at end of POC (pt aware/agreeable)     Pt to continue current HEP. See objective section for any therapeutic exercise changes, additions or modifications this date.     PT Individual Minutes  Time In: 1194  Time Out: 1510  Minutes: 48  Timed Code Treatment Minutes: 38 Minutes  Procedure Minutes: 10    Signature:  Electronically signed by Suyapa Seay PTA on 5/1/19 at 2:38 PM

## 2019-05-06 ENCOUNTER — HOSPITAL ENCOUNTER (OUTPATIENT)
Dept: PHYSICAL THERAPY | Age: 54
Setting detail: THERAPIES SERIES
Discharge: HOME | End: 2019-05-06
Payer: COMMERCIAL

## 2019-05-06 PROCEDURE — 97110 THERAPEUTIC EXERCISES: CPT

## 2019-05-06 ASSESSMENT — PAIN DESCRIPTION - LOCATION: LOCATION: BACK;LEG

## 2019-05-06 ASSESSMENT — PAIN DESCRIPTION - PAIN TYPE: TYPE: CHRONIC PAIN

## 2019-05-06 ASSESSMENT — PAIN DESCRIPTION - ORIENTATION: ORIENTATION: RIGHT

## 2019-05-06 ASSESSMENT — PAIN DESCRIPTION - DESCRIPTORS: DESCRIPTORS: ACHING;SORE

## 2019-05-06 ASSESSMENT — PAIN SCALES - GENERAL: PAINLEVEL_OUTOF10: 5

## 2019-05-06 NOTE — PROGRESS NOTES
[address] [address]  Outpatient Physical Therapy    Treatment Note        Date: 2019  Patient: Guru Porter  : 1965  ACCT #: [de-identified]  Referring Practitioner: Stacey Vizcaino DO  Diagnosis: R sciatica    Visit Information:  PT Visit Information  Onset Date: 04/10/19  PT Insurance Information: [address]  Total # of Visits Approved: 20  Total # of Visits to Date: 8  No Show: 0  Canceled Appointment: 0  Progress Note Counter: 8/8-10    Subjective: Pt reports she was mulching this weekend and is sore from that otherwise no new complaints. Comments: RTD? HEP Compliance:  [x] Good [] Fair [] Poor [] Reports not doing due to:    Vital Signs  Patient Currently in Pain: Yes   Pain Screening  Patient Currently in Pain: Yes  Pain Assessment  Pain Assessment: 0-10  Pain Level: 5  Pain Type: Chronic pain  Pain Location: Back;Leg(calf)  Pain Orientation: Right  Pain Descriptors: Aching; Sore    OBJECTIVE:   Exercises  Exercise 6: TA with breath 5s x 20  Exercise 9: rev crunches x10 w/ ball between knees  Exercise 10: DKTC 10''x5  Exercise 11: Piriformis str knee to opp shoulder 30s x 3 b/l  Exercise 15: TA SLR x12  Exercise 16: TA iso bridge 3''x12  Exercise 17: S/L hip abd and hip circles x10 ea b/l  Exercise 18: pball TA isos, DLS, posterior/ant/lat pelvic tilts, LAQ x20 ea  Exercise 19: prone hip ext x10 b/l  Exercise 20: HEP: rev crunches, pball pelvic tilts    Strength: [x] NT  [] MMT completed:  Strength RLE  Comment: hip 4+/5, quad 5/5, HS 5/5, ankle WNL  ROM: [x] NT  [] ROM measurements:    Modalities:  Modalities  Moist heat: x10 to low back to decrease mm tightness/spasms/pain     *Indicates exercise, modality, or manual techniques to be initiated when appropriate    Assessment:    Body structures, Functions, Activity limitations: Decreased ROM, Decreased strength, Increased Pain, Decreased high-level IADLs  Assessment: re-initiated some stretching this date d/t increased soreness after mulching this weekend. Progressed select ex's and HEP. Pt requiring constant cueing and visual demo for pelvic tilts on pball. Much improved strength since initial eval. Pt would like to be D/C'd NV. Treatment Diagnosis: back pain, R hip pain, R leg pain, impaired lumbar and hip ROM, impaired R LE strength and flexibility  Prognosis: Good     Goals:  Long term goals  Time Frame for Long term goals : 4-6  Long term goal 1: Pt will be indep and compliant with HEP to manage symtoms. Long term goal 2: Pt will increase lumbar AROM to WNL and R hip AROM >10 degrees to facilitate improved ability to perform daily tasks with decreased pain. Long term goal 3: Pt will increase R hip and HS strength to >4+/5 and core strength to >3/5 (double leg lowering) to improve functional mobility  Long term goal 4: Pt will improve LEFS to >/= 49/50 to demonstrate improved  functional activity tolerance. Long term goal 5: Pt will decrease pain with functional activities <2-3/10 to improve QOL. Progress toward goals: inc strength, rom, dec pain    POST-PAIN       Pain Rating (0-10 pain scale):   0/10   Location and pain description same as pre-treatment unless indicated. Action: [] NA   [x] Perform HEP  [] Meds as prescribed  [] Modalities as prescribed   [] Call Physician     Frequency/Duration:  Plan  Times per week: 2  Plan weeks: 4-6  Specific instructions for Next Treatment: D/C NV  Current Treatment Recommendations: ROM, Manual Therapy - Joint Manipulation, Cognitive Reorientation, Pain Management, Modalities, Equipment Evaluation, Education, & procurement, Patient/Caregiver Education & Training, Positioning, Home Exercise Program, Safety Education & Training, Manual Therapy - Soft Tissue Mobilization, Functional Mobility Training, Neuromuscular Re-education     Pt to continue current HEP. See objective section for any therapeutic exercise changes, additions or modifications this date.     PT Individual Minutes  Time In: 1410  Time Out: 1500  Minutes: 50  Timed Code Treatment Minutes: 40 Minutes  Procedure Minutes: 10    Signature:  Electronically signed by Ginny Guillen PTA on 5/6/19 at 2:14 PM

## 2019-05-08 ENCOUNTER — HOSPITAL ENCOUNTER (OUTPATIENT)
Dept: PHYSICAL THERAPY | Age: 54
Setting detail: THERAPIES SERIES
Discharge: HOME | End: 2019-05-08
Payer: COMMERCIAL

## 2019-05-08 PROCEDURE — 97110 THERAPEUTIC EXERCISES: CPT

## 2019-05-08 ASSESSMENT — PAIN SCALES - GENERAL: PAINLEVEL_OUTOF10: 2

## 2019-05-08 ASSESSMENT — PAIN DESCRIPTION - PAIN TYPE: TYPE: CHRONIC PAIN

## 2019-05-08 ASSESSMENT — PAIN DESCRIPTION - DESCRIPTORS: DESCRIPTORS: TIGHTNESS;SORE

## 2019-05-08 NOTE — PROGRESS NOTES
[address] [address]  Outpatient Physical Therapy    Treatment Note        Date: 2019  Patient: Kiel Tony  : 1965  ACCT #: [de-identified]  Referring Practitioner: Tamra Lara DO  Diagnosis: R sciatica    Visit Information:  PT Visit Information  Onset Date: 04/10/19  PT Insurance Information: [employer]  Total # of Visits Approved: 20  Total # of Visits to Date: 9  No Show: 0  Canceled Appointment: 0  Progress Note Counter: 9/8-10    Subjective: Pt reports 2-3/10 pain in R calf. Pt states she has been stretching a lot, which helps but doesn't completely take it away. Pt is prepared for D/C today. Pt reports pain ranges 0-5/10. Comments: RTD? HEP Compliance:  [x] Good [] Fair [] Poor [] Reports not doing due to:    Vital Signs  Patient Currently in Pain: Yes   Pain Screening  Patient Currently in Pain: Yes  Pain Assessment  Pain Assessment: 0-10  Pain Level: 2  Pain Type: Chronic pain  Pain Location: (calf)  Pain Descriptors: Tightness; Sore    OBJECTIVE:   Exercises  Exercise 5: HS stretch longsitting 3x20'' b/l (reviewed seated and standing HS str for HEP also)  Exercise 6: TA with breath 5s x 20  Exercise 9: rev crunches x12 w/ ball between knees  Exercise 15: TA SLR x15  Exercise 16: TA iso bridge 3''x15  Exercise 17: S/L hip abd and hip circles x12 ea b/l  Exercise 18: pball TA isos, DLS, posterior/ant/lat pelvic tilts, LAQ x20 ea  Exercise 19: prone hip ext x12 b/l    Strength: [] NT  [x] MMT completed:  Strength RLE  Comment: hip 4+/5, quad 5/5, HS 5/5, ankle WNL  Strength LLE  Comment: hip 5/5, quad 5/5, HS 5/5, ankle WNL     Strength Other  Other: core 3+ to 4-/5 (doubleleg lowering)    ROM: [] NT  [x] ROM measurements:  PROM RLE (degrees)  RLE General PROM: SLR 56  AROM RLE (degrees)  RLE General AROM: IR 0-40, ER 0-40     *Indicates exercise, modality, or manual techniques to be initiated when appropriate    Assessment:    Body structures, Functions, Activity limitations: Decreased ROM, Decreased strength, Increased Pain, Decreased high-level IADLs  Assessment: Pt has met all goals this date and is prepared for D/C. Pt reports pain has only been up to a 5-6/10 d/t doing a lot of yardwork, but otherwise maintains about 0-3/10. Treatment Diagnosis: back pain, R hip pain, R leg pain, impaired lumbar and hip ROM, impaired R LE strength and flexibility  Prognosis: Good     Goals:  Long term goals  Time Frame for Long term goals : 4-6  Long term goal 1: Pt will be indep and compliant with HEP to manage symtoms. Long term goal 2: Pt will increase lumbar AROM to WNL and R hip AROM >10 degrees to facilitate improved ability to perform daily tasks with decreased pain. Long term goal 3: Pt will increase R hip and HS strength to >4+/5 and core strength to >3/5 (double leg lowering) to improve functional mobility  Long term goal 4: Pt will improve LEFS to >/= 49/50 to demonstrate improved  functional activity tolerance. Long term goal 5: Pt will decrease pain with functional activities <2-3/10 to improve QOL. Progress toward goals: see D/C    POST-PAIN       Pain Rating (0-10 pain scale):   0/10   Location and pain description same as pre-treatment unless indicated. Action: [] NA   [x] Perform HEP  [] Meds as prescribed  [] Modalities as prescribed   [] Call Physician     Frequency/Duration:  Plan  Plan Comment: D/C PT today     Pt to continue current HEP. See objective section for any therapeutic exercise changes, additions or modifications this date.     PT Individual Minutes  Time In: 8284  Time Out: 5043  Minutes: 40  Timed Code Treatment Minutes: 40 Minutes  Procedure Minutes: 0    Signature:  Electronically signed by Shmuel Arroyo PTA on 5/8/19 at 2:07 PM

## 2019-05-08 NOTE — DISCHARGE SUMMARY
Increased Pain, Decreased high-level IADLs  Assessment: Pt has met all goals this date and is prepared for D/C. Pt reports pain has only been up to a 5-6/10 d/t doing a lot of yardwork, but otherwise maintains about 0-3/10. Pt is indep with HEP to manage symptoms at home. Prognosis: Good    PLAN:   Frequency/Duration:  Plan  Plan Comment: D/C PT today     Precautions:                  Patient Status:[] Continue/ Initiate plan of Care    [x] Discharge PT. Recommend pt continue with HEP. [] Additional visits requested, Please re-certify for additional visits:          Signature: Objective Information Obtained by: Electronically signed by Kaila Seth PTA on 5/8/19 at 2:20 PM  Electronically signed by Bryon Kent PT on 5/8/2019 at 4:15 PM      If you have any questions or concerns, please don't hesitate to call. Thank you for your referral.    I have reviewed this plan of care and certify a need for medically necessary rehabilitation services.     Physician Signature:__________________________________________________________  Date:  Please sign and return

## 2021-07-06 ENCOUNTER — OFFICE VISIT (OUTPATIENT)
Dept: FAMILY MEDICINE CLINIC | Age: 56
End: 2021-07-06
Payer: COMMERCIAL

## 2021-07-06 VITALS
HEART RATE: 73 BPM | DIASTOLIC BLOOD PRESSURE: 78 MMHG | WEIGHT: 132 LBS | BODY MASS INDEX: 20 KG/M2 | HEIGHT: 68 IN | OXYGEN SATURATION: 98 % | SYSTOLIC BLOOD PRESSURE: 118 MMHG

## 2021-07-06 DIAGNOSIS — I10 ESSENTIAL HYPERTENSION: Primary | ICD-10-CM

## 2021-07-06 DIAGNOSIS — I10 ESSENTIAL HYPERTENSION: ICD-10-CM

## 2021-07-06 LAB
ALBUMIN SERPL-MCNC: 4.3 G/DL (ref 3.5–4.6)
ALP BLD-CCNC: 93 U/L (ref 40–130)
ALT SERPL-CCNC: 9 U/L (ref 0–33)
ANION GAP SERPL CALCULATED.3IONS-SCNC: 11 MEQ/L (ref 9–15)
AST SERPL-CCNC: 18 U/L (ref 0–35)
BILIRUB SERPL-MCNC: 0.3 MG/DL (ref 0.2–0.7)
BUN BLDV-MCNC: 27 MG/DL (ref 6–20)
CALCIUM SERPL-MCNC: 10.1 MG/DL (ref 8.5–9.9)
CHLORIDE BLD-SCNC: 100 MEQ/L (ref 95–107)
CO2: 23 MEQ/L (ref 20–31)
CREAT SERPL-MCNC: 1.1 MG/DL (ref 0.5–0.9)
GFR AFRICAN AMERICAN: >60
GFR NON-AFRICAN AMERICAN: 51
GLOBULIN: 2.4 G/DL (ref 2.3–3.5)
GLUCOSE BLD-MCNC: 107 MG/DL (ref 70–99)
POTASSIUM SERPL-SCNC: 4.2 MEQ/L (ref 3.4–4.9)
SODIUM BLD-SCNC: 134 MEQ/L (ref 135–144)
TOTAL PROTEIN: 6.7 G/DL (ref 6.3–8)

## 2021-07-06 PROCEDURE — 1036F TOBACCO NON-USER: CPT | Performed by: NURSE PRACTITIONER

## 2021-07-06 PROCEDURE — 3017F COLORECTAL CA SCREEN DOC REV: CPT | Performed by: NURSE PRACTITIONER

## 2021-07-06 PROCEDURE — 99203 OFFICE O/P NEW LOW 30 MIN: CPT | Performed by: NURSE PRACTITIONER

## 2021-07-06 PROCEDURE — G8427 DOCREV CUR MEDS BY ELIG CLIN: HCPCS | Performed by: NURSE PRACTITIONER

## 2021-07-06 PROCEDURE — G8420 CALC BMI NORM PARAMETERS: HCPCS | Performed by: NURSE PRACTITIONER

## 2021-07-06 SDOH — ECONOMIC STABILITY: TRANSPORTATION INSECURITY
IN THE PAST 12 MONTHS, HAS LACK OF TRANSPORTATION KEPT YOU FROM MEETINGS, WORK, OR FROM GETTING THINGS NEEDED FOR DAILY LIVING?: NO

## 2021-07-06 SDOH — ECONOMIC STABILITY: FOOD INSECURITY: WITHIN THE PAST 12 MONTHS, THE FOOD YOU BOUGHT JUST DIDN'T LAST AND YOU DIDN'T HAVE MONEY TO GET MORE.: NEVER TRUE

## 2021-07-06 SDOH — ECONOMIC STABILITY: FOOD INSECURITY: WITHIN THE PAST 12 MONTHS, YOU WORRIED THAT YOUR FOOD WOULD RUN OUT BEFORE YOU GOT MONEY TO BUY MORE.: NEVER TRUE

## 2021-07-06 SDOH — ECONOMIC STABILITY: TRANSPORTATION INSECURITY
IN THE PAST 12 MONTHS, HAS THE LACK OF TRANSPORTATION KEPT YOU FROM MEDICAL APPOINTMENTS OR FROM GETTING MEDICATIONS?: NO

## 2021-07-06 ASSESSMENT — PATIENT HEALTH QUESTIONNAIRE - PHQ9
SUM OF ALL RESPONSES TO PHQ QUESTIONS 1-9: 0
SUM OF ALL RESPONSES TO PHQ9 QUESTIONS 1 & 2: 0
SUM OF ALL RESPONSES TO PHQ QUESTIONS 1-9: 0
SUM OF ALL RESPONSES TO PHQ QUESTIONS 1-9: 0
2. FEELING DOWN, DEPRESSED OR HOPELESS: 0
1. LITTLE INTEREST OR PLEASURE IN DOING THINGS: 0

## 2021-07-06 ASSESSMENT — ENCOUNTER SYMPTOMS
SHORTNESS OF BREATH: 0
COUGH: 0
CONSTIPATION: 0
DIARRHEA: 0

## 2021-07-06 ASSESSMENT — SOCIAL DETERMINANTS OF HEALTH (SDOH): HOW HARD IS IT FOR YOU TO PAY FOR THE VERY BASICS LIKE FOOD, HOUSING, MEDICAL CARE, AND HEATING?: NOT HARD AT ALL

## 2021-07-06 NOTE — PROGRESS NOTES
Subjective  Chief Complaint   Patient presents with    Hypertension     pt states BP has been high at home.  Health Maintenance     pt declines mammogram        HPI     Bp's have been elevated recently. Had been on benicar. Brought in readings with her. Headaches, very tired. Slightly light headed. Diastolics have been over 814. Has been watching salt intake. Taking care of her mom. Nothing new acutely different. Otherwise feels \"ok\"    Patient Active Problem List    Diagnosis Date Noted    Abdominal pain     Kidney stone on left side     Hypertension      Past Medical History:   Diagnosis Date    Abdominal pain     Hypertension     Kidney stone on left side 02/2017    Postmenopausal      History reviewed. No pertinent surgical history. 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   Tobacco Use    Smoking status: Never Smoker    Smokeless tobacco: Never Used   Substance and Sexual Activity    Alcohol use: Yes     Comment: rare    Drug use: No    Sexual activity: None   Other Topics Concern    None   Social History Narrative    None     Social Determinants of Health     Financial Resource Strain: Low Risk     Difficulty of Paying Living Expenses: Not hard at all   Food Insecurity: No Food Insecurity    Worried About Running Out of Food in the Last Year: Never true    Sultana of Food in the Last Year: Never true   Transportation Needs: No Transportation Needs    Lack of Transportation (Medical): No    Lack of Transportation (Non-Medical):  No   Physical Activity:     Days of Exercise per Week:     Minutes of Exercise per Session:    Stress:     Feeling of Stress :    Social Connections:     Frequency of Communication with Friends and Family:     Frequency of Social Gatherings with Friends and Family:     Attends Gnosticism Services:     Active Member of Clubs or Organizations:     Attends Club or Organization Meetings:     Marital Status:    Intimate Partner Violence:     Fear of Current or Ex-Partner:     Emotionally Abused:     Physically Abused:     Sexually Abused:      No current outpatient medications on file prior to visit. No current facility-administered medications on file prior to visit. No Known Allergies    Review of Systems   Constitutional: Negative for fatigue. Respiratory: Negative for cough and shortness of breath. Cardiovascular: Negative for chest pain. Gastrointestinal: Negative for constipation and diarrhea. Neurological: Positive for headaches. Objective  Vitals:    07/06/21 1407   BP: 118/78   Pulse: 73   SpO2: 98%   Weight: 132 lb (59.9 kg)   Height: 5' 8\" (1.727 m)     Physical Exam  Vitals and nursing note reviewed. Constitutional:       Appearance: Normal appearance. She is normal weight. HENT:      Head: Normocephalic. Nose: Nose normal.      Mouth/Throat:      Mouth: Mucous membranes are moist.      Pharynx: Oropharynx is clear. Eyes:      Extraocular Movements: Extraocular movements intact. Conjunctiva/sclera: Conjunctivae normal.      Pupils: Pupils are equal, round, and reactive to light. Cardiovascular:      Rate and Rhythm: Normal rate and regular rhythm. Pulses: Normal pulses. Heart sounds: Normal heart sounds. Pulmonary:      Effort: Pulmonary effort is normal.      Breath sounds: Normal breath sounds. Skin:     General: Skin is warm. Neurological:      General: No focal deficit present. Mental Status: She is alert and oriented to person, place, and time. Mental status is at baseline.    Psychiatric:         Mood and Affect: Mood normal.         Behavior: Behavior normal. Thought Content: Thought content normal.         Judgment: Judgment normal.       Assessment & Plan     Diagnosis Orders   1. Essential hypertension  Comprehensive Metabolic Panel    metoprolol tartrate (LOPRESSOR) 25 MG tablet       Orders Placed This Encounter   Procedures    Comprehensive Metabolic Panel     Standing Status:   Future     Number of Occurrences:   1     Standing Expiration Date:   7/6/2022       Orders Placed This Encounter   Medications    metoprolol tartrate (LOPRESSOR) 25 MG tablet     Sig: Take 1 tablet by mouth daily     Dispense:  30 tablet     Refill:  5     Side effects, adverse effects of the medication prescribed today, as well as treatment plan/ rationale and result expectations have been discussed with the patient who expresses understanding and desires to proceed. Close follow up to evaluate treatment results and for coordination of care. I have reviewed the patient's medical history in detail and updated the computerized patient record. As always, patient is advised that if symptoms worsen in any way they will proceed to the nearest emergency room. FU in 4 weeks.     Fabio Mckenzie, GODFREY - CNP

## 2021-07-27 ENCOUNTER — OFFICE VISIT (OUTPATIENT)
Dept: FAMILY MEDICINE CLINIC | Age: 56
End: 2021-07-27
Payer: COMMERCIAL

## 2021-07-27 VITALS
WEIGHT: 132 LBS | DIASTOLIC BLOOD PRESSURE: 70 MMHG | HEIGHT: 68 IN | BODY MASS INDEX: 20 KG/M2 | OXYGEN SATURATION: 98 % | HEART RATE: 69 BPM | SYSTOLIC BLOOD PRESSURE: 90 MMHG

## 2021-07-27 DIAGNOSIS — R53.83 FATIGUE, UNSPECIFIED TYPE: ICD-10-CM

## 2021-07-27 DIAGNOSIS — E21.3 HYPERPARATHYROIDISM (HCC): ICD-10-CM

## 2021-07-27 DIAGNOSIS — I10 ESSENTIAL HYPERTENSION: Primary | ICD-10-CM

## 2021-07-27 DIAGNOSIS — Z83.49 FAMILY HISTORY OF THYROID DISEASE: ICD-10-CM

## 2021-07-27 LAB
PARATHYROID HORMONE INTACT: 53.8 PG/ML (ref 15–65)
TSH REFLEX: 0.58 UIU/ML (ref 0.44–3.86)

## 2021-07-27 PROCEDURE — G8427 DOCREV CUR MEDS BY ELIG CLIN: HCPCS | Performed by: NURSE PRACTITIONER

## 2021-07-27 PROCEDURE — 1036F TOBACCO NON-USER: CPT | Performed by: NURSE PRACTITIONER

## 2021-07-27 PROCEDURE — 99213 OFFICE O/P EST LOW 20 MIN: CPT | Performed by: NURSE PRACTITIONER

## 2021-07-27 PROCEDURE — G8420 CALC BMI NORM PARAMETERS: HCPCS | Performed by: NURSE PRACTITIONER

## 2021-07-27 PROCEDURE — 3017F COLORECTAL CA SCREEN DOC REV: CPT | Performed by: NURSE PRACTITIONER

## 2021-07-27 ASSESSMENT — ENCOUNTER SYMPTOMS
SHORTNESS OF BREATH: 0
COUGH: 0

## 2021-07-27 NOTE — PROGRESS NOTES
Subjective  Chief Complaint   Patient presents with    Hypertension     2 week f/u    Health Maintenance     follows with Dr. Cuco Larose, states that she did cologuard. will have her order mammo       HPI     BP follow up. Very fatigued with medication. Has been watching sodium intake. Has had some lightheadedness and dizziness. BP seems to be getting somewhat on the low side. Pt is very active as well. Patient Active Problem List    Diagnosis Date Noted    Abdominal pain     Kidney stone on left side     Hypertension      Past Medical History:   Diagnosis Date    Abdominal pain     Hypertension     Kidney stone on left side 02/2017    Postmenopausal      No past surgical history on file. 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   Tobacco Use    Smoking status: Never Smoker    Smokeless tobacco: Never Used   Substance and Sexual Activity    Alcohol use: Yes     Comment: rare    Drug use: No    Sexual activity: None   Other Topics Concern    None   Social History Narrative    None     Social Determinants of Health     Financial Resource Strain: Low Risk     Difficulty of Paying Living Expenses: Not hard at all   Food Insecurity: No Food Insecurity    Worried About Running Out of Food in the Last Year: Never true    Sultana of Food in the Last Year: Never true   Transportation Needs: No Transportation Needs    Lack of Transportation (Medical): No    Lack of Transportation (Non-Medical):  No   Physical Activity:     Days of Exercise per Week:     Minutes of Exercise per Session:    Stress:     Feeling of Stress :    Social Connections:     Frequency of Communication with Friends and Family:     Frequency of Social Gatherings with Friends and Family:     Attends Druze Services:     Active Member of Clubs or Organizations:     Attends Club or Organization Meetings:     Marital Status:    Intimate Partner Violence:     Fear of Current or Ex-Partner:     Emotionally Abused:     Physically Abused:     Sexually Abused:      Current Outpatient Medications on File Prior to Visit   Medication Sig Dispense Refill    metoprolol tartrate (LOPRESSOR) 25 MG tablet Take 1 tablet by mouth daily 30 tablet 5     No current facility-administered medications on file prior to visit. No Known Allergies    Review of Systems   Constitutional: Negative for fatigue. Respiratory: Negative for cough and shortness of breath. Cardiovascular: Negative for chest pain. Neurological: Positive for light-headedness. Objective  Vitals:    07/27/21 0801   BP: 90/70   Site: Left Upper Arm   Position: Sitting   Cuff Size: Medium Adult   Pulse: 69   SpO2: 98%   Weight: 132 lb (59.9 kg)   Height: 5' 8\" (1.727 m)     Physical Exam  Vitals and nursing note reviewed. Constitutional:       Appearance: Normal appearance. She is normal weight. HENT:      Head: Normocephalic. Nose: Nose normal.      Mouth/Throat:      Mouth: Mucous membranes are moist.      Pharynx: Oropharynx is clear. Eyes:      Extraocular Movements: Extraocular movements intact. Conjunctiva/sclera: Conjunctivae normal.      Pupils: Pupils are equal, round, and reactive to light. Cardiovascular:      Rate and Rhythm: Normal rate and regular rhythm. Pulses: Normal pulses. Heart sounds: Normal heart sounds. Pulmonary:      Effort: Pulmonary effort is normal.      Breath sounds: Normal breath sounds. Skin:     General: Skin is warm. Neurological:      General: No focal deficit present. Mental Status: She is alert and oriented to person, place, and time. Mental status is at baseline. Psychiatric:         Mood and Affect: Mood normal.         Behavior: Behavior normal.         Thought Content: Thought content normal.         Judgment: Judgment normal.         Assessment & Plan     Diagnosis Orders   1. Essential hypertension     2. Family history of thyroid disease  TSH with Reflex   3. Fatigue, unspecified type  TSH with Reflex   4. Hyperparathyroidism ([facility] [address]  PTH, Intact          Orders Placed This Encounter   Procedures    TSH with Reflex     Standing Status:   Future     Standing Expiration Date:   7/27/2022    PTH, Intact     Standing Status:   Future     Standing Expiration Date:   7/27/2022     Side effects, adverse effects of the medication prescribed today, as well as treatment plan/ rationale and result expectations have been discussed with the patient who expresses understanding and desires to proceed. Close follow up to evaluate treatment results and for coordination of care. I have reviewed the patient's medical history in detail and updated the computerized patient record. As always, patient is advised that if symptoms worsen in any way they will proceed to the nearest emergency room. Pt is going to wean off lopressor since BPS are lower. Continue to watch diet. Will fu with these labs.     Alexandra Gastelum, APRN - CNP

## 2021-09-21 ENCOUNTER — HOSPITAL ENCOUNTER (OUTPATIENT)
Dept: WOMENS IMAGING | Age: 56
Discharge: HOME | End: 2021-09-23
Payer: COMMERCIAL

## 2021-09-21 DIAGNOSIS — Z12.31 SCREENING MAMMOGRAM FOR HIGH-RISK PATIENT: ICD-10-CM

## 2021-09-21 PROCEDURE — 77063 BREAST TOMOSYNTHESIS BI: CPT

## 2021-09-29 ENCOUNTER — HOSPITAL ENCOUNTER (OUTPATIENT)
Dept: WOMENS IMAGING | Age: 56
Discharge: HOME | End: 2021-10-01
Payer: COMMERCIAL

## 2021-09-29 ENCOUNTER — APPOINTMENT (OUTPATIENT)
Dept: ULTRASOUND IMAGING | Age: 56
End: 2021-09-29
Payer: COMMERCIAL

## 2021-09-29 DIAGNOSIS — R92.8 ABNORMAL MAMMOGRAM: ICD-10-CM

## 2021-09-29 PROCEDURE — 77065 DX MAMMO INCL CAD UNI: CPT

## 2022-12-07 ENCOUNTER — HOSPITAL ENCOUNTER (OUTPATIENT)
Dept: WOMENS IMAGING | Age: 57
Discharge: HOME | End: 2022-12-09
Payer: COMMERCIAL

## 2022-12-07 DIAGNOSIS — Z12.31 ENCOUNTER FOR SCREENING MAMMOGRAM FOR BREAST CANCER: ICD-10-CM

## 2022-12-07 PROCEDURE — 77067 SCR MAMMO BI INCL CAD: CPT

## 2024-01-15 ENCOUNTER — HOSPITAL ENCOUNTER (OUTPATIENT)
Dept: WOMENS IMAGING | Age: 59
Discharge: HOME | End: 2024-01-17
Payer: COMMERCIAL

## 2024-01-15 VITALS — BODY MASS INDEX: 20.07 KG/M2 | HEIGHT: 68 IN

## 2024-01-15 DIAGNOSIS — Z12.31 ENCOUNTER FOR SCREENING MAMMOGRAM FOR MALIGNANT NEOPLASM OF BREAST: ICD-10-CM

## 2024-01-15 PROCEDURE — 77063 BREAST TOMOSYNTHESIS BI: CPT

## 2024-02-26 ENCOUNTER — TRANSCRIBE ORDERS (OUTPATIENT)
Dept: GENERAL RADIOLOGY | Age: 59
End: 2024-02-26

## 2024-02-26 ENCOUNTER — HOSPITAL ENCOUNTER (OUTPATIENT)
Dept: GENERAL RADIOLOGY | Age: 59
Discharge: HOME | End: 2024-02-28
Payer: COMMERCIAL

## 2024-02-26 DIAGNOSIS — R09.1 PLEURISY: ICD-10-CM

## 2024-02-26 DIAGNOSIS — R09.1 PLEURISY: Primary | ICD-10-CM

## 2024-02-26 PROCEDURE — 71046 X-RAY EXAM CHEST 2 VIEWS: CPT

## 2024-04-23 ENCOUNTER — OFFICE VISIT (OUTPATIENT)
Dept: FAMILY MEDICINE CLINIC | Age: 59
End: 2024-04-23
Payer: COMMERCIAL

## 2024-04-23 VITALS
WEIGHT: 117 LBS | OXYGEN SATURATION: 99 % | DIASTOLIC BLOOD PRESSURE: 90 MMHG | HEART RATE: 82 BPM | SYSTOLIC BLOOD PRESSURE: 140 MMHG | HEIGHT: 68 IN | BODY MASS INDEX: 17.73 KG/M2

## 2024-04-23 DIAGNOSIS — N18.30 STAGE 3 CHRONIC KIDNEY DISEASE, UNSPECIFIED WHETHER STAGE 3A OR 3B CKD (HCC): ICD-10-CM

## 2024-04-23 DIAGNOSIS — E78.5 HYPERLIPIDEMIA, UNSPECIFIED HYPERLIPIDEMIA TYPE: ICD-10-CM

## 2024-04-23 DIAGNOSIS — I10 PRIMARY HYPERTENSION: ICD-10-CM

## 2024-04-23 DIAGNOSIS — R09.1 PLEURISY: Primary | ICD-10-CM

## 2024-04-23 PROCEDURE — 99204 OFFICE O/P NEW MOD 45 MIN: CPT | Performed by: STUDENT IN AN ORGANIZED HEALTH CARE EDUCATION/TRAINING PROGRAM

## 2024-04-23 PROCEDURE — 3077F SYST BP >= 140 MM HG: CPT | Performed by: STUDENT IN AN ORGANIZED HEALTH CARE EDUCATION/TRAINING PROGRAM

## 2024-04-23 PROCEDURE — 3017F COLORECTAL CA SCREEN DOC REV: CPT | Performed by: STUDENT IN AN ORGANIZED HEALTH CARE EDUCATION/TRAINING PROGRAM

## 2024-04-23 PROCEDURE — G8427 DOCREV CUR MEDS BY ELIG CLIN: HCPCS | Performed by: STUDENT IN AN ORGANIZED HEALTH CARE EDUCATION/TRAINING PROGRAM

## 2024-04-23 PROCEDURE — G8419 CALC BMI OUT NRM PARAM NOF/U: HCPCS | Performed by: STUDENT IN AN ORGANIZED HEALTH CARE EDUCATION/TRAINING PROGRAM

## 2024-04-23 PROCEDURE — 3080F DIAST BP >= 90 MM HG: CPT | Performed by: STUDENT IN AN ORGANIZED HEALTH CARE EDUCATION/TRAINING PROGRAM

## 2024-04-23 PROCEDURE — 1036F TOBACCO NON-USER: CPT | Performed by: STUDENT IN AN ORGANIZED HEALTH CARE EDUCATION/TRAINING PROGRAM

## 2024-04-23 RX ORDER — ATORVASTATIN CALCIUM 10 MG/1
TABLET, FILM COATED ORAL
COMMUNITY
Start: 2022-11-09

## 2024-04-23 RX ORDER — AZITHROMYCIN 250 MG/1
TABLET, FILM COATED ORAL
Qty: 6 TABLET | Refills: 0 | Status: SHIPPED | OUTPATIENT
Start: 2024-04-23 | End: 2024-05-03

## 2024-04-23 RX ORDER — ELECTROLYTES/DEXTROSE
SOLUTION, ORAL ORAL
COMMUNITY

## 2024-04-23 SDOH — ECONOMIC STABILITY: HOUSING INSECURITY
IN THE LAST 12 MONTHS, WAS THERE A TIME WHEN YOU DID NOT HAVE A STEADY PLACE TO SLEEP OR SLEPT IN A SHELTER (INCLUDING NOW)?: NO

## 2024-04-23 SDOH — ECONOMIC STABILITY: FOOD INSECURITY: WITHIN THE PAST 12 MONTHS, YOU WORRIED THAT YOUR FOOD WOULD RUN OUT BEFORE YOU GOT MONEY TO BUY MORE.: NEVER TRUE

## 2024-04-23 SDOH — ECONOMIC STABILITY: FOOD INSECURITY: WITHIN THE PAST 12 MONTHS, THE FOOD YOU BOUGHT JUST DIDN'T LAST AND YOU DIDN'T HAVE MONEY TO GET MORE.: NEVER TRUE

## 2024-04-23 SDOH — ECONOMIC STABILITY: INCOME INSECURITY: HOW HARD IS IT FOR YOU TO PAY FOR THE VERY BASICS LIKE FOOD, HOUSING, MEDICAL CARE, AND HEATING?: NOT HARD AT ALL

## 2024-04-23 ASSESSMENT — ENCOUNTER SYMPTOMS
COUGH: 0
SORE THROAT: 0
VOMITING: 0
ABDOMINAL PAIN: 0
SINUS PRESSURE: 0
SHORTNESS OF BREATH: 0

## 2024-04-23 ASSESSMENT — PATIENT HEALTH QUESTIONNAIRE - PHQ9
SUM OF ALL RESPONSES TO PHQ QUESTIONS 1-9: 0
SUM OF ALL RESPONSES TO PHQ9 QUESTIONS 1 & 2: 0
2. FEELING DOWN, DEPRESSED OR HOPELESS: NOT AT ALL
1. LITTLE INTEREST OR PLEASURE IN DOING THINGS: NOT AT ALL
SUM OF ALL RESPONSES TO PHQ QUESTIONS 1-9: 0

## 2024-04-23 NOTE — PROGRESS NOTES
2024    Rosette Hartman (:  1965) is a 59 y.o. female, here for evaluation of the following medical concerns:  Chief Complaint   Patient presents with    Chest Pain     States in December she had a bad cold/strep and had pain in chest and rib area. 10 years ago dx pleurisy and was given medrol. States she got another cold in Feb and now she has the pain again. PCP gave her prednisone and told her to take tylenol when she told them it was not working. Hx of CKD and state she has to be careful what she takes.      HPI  Chest pain  Seen by PCP  and diagnosed with strep pharyngitis and pleurisy  Prescribed methylprednisolone and cefuroxime   chest x-ray nonacute  Was also treated in December for pleurisy, treated with steroid and symptoms resolved  February symptoms returned, treated with prednisone, didn't help at all    Today  Has symptoms with deep breaths or activity  Worse at nighttime  Pain is sharp in nature  Symptoms always present but intensifies at times  Central chest, lower ribs/upper ribs  No URI symptoms    Never smoker    Review of Systems   Constitutional:  Negative for chills and fever.   HENT:  Negative for congestion, sinus pressure and sore throat.    Respiratory:  Negative for cough and shortness of breath.    Cardiovascular:  Positive for chest pain. Negative for palpitations.   Gastrointestinal:  Negative for abdominal pain and vomiting.   Musculoskeletal:  Negative for arthralgias and myalgias.   Skin:  Negative for rash and wound.   Neurological:  Negative for speech difficulty and light-headedness.   Psychiatric/Behavioral:  Negative for suicidal ideas. The patient is not nervous/anxious.        Prior to Visit Medications    Medication Sig Taking? Authorizing Provider   atorvastatin (LIPITOR) 10 MG tablet Take by mouth Yes Kailee Burnette MD   CALCIUM PO Calcium Active Yes Kailee Burnette MD   Cyanocobalamin (VITAMIN B-12 ER PO) Vitamin B12 Active Yes

## 2024-04-30 ENCOUNTER — OFFICE VISIT (OUTPATIENT)
Dept: FAMILY MEDICINE CLINIC | Age: 59
End: 2024-04-30
Payer: COMMERCIAL

## 2024-04-30 VITALS
SYSTOLIC BLOOD PRESSURE: 110 MMHG | DIASTOLIC BLOOD PRESSURE: 80 MMHG | HEIGHT: 68 IN | BODY MASS INDEX: 19.19 KG/M2 | WEIGHT: 126.6 LBS | OXYGEN SATURATION: 99 % | HEART RATE: 64 BPM

## 2024-04-30 DIAGNOSIS — R09.1 PLEURISY: Primary | ICD-10-CM

## 2024-04-30 DIAGNOSIS — I10 PRIMARY HYPERTENSION: ICD-10-CM

## 2024-04-30 DIAGNOSIS — R07.81 RIB PAIN: ICD-10-CM

## 2024-04-30 DIAGNOSIS — N18.30 STAGE 3 CHRONIC KIDNEY DISEASE, UNSPECIFIED WHETHER STAGE 3A OR 3B CKD (HCC): ICD-10-CM

## 2024-04-30 DIAGNOSIS — E78.5 HYPERLIPIDEMIA, UNSPECIFIED HYPERLIPIDEMIA TYPE: ICD-10-CM

## 2024-04-30 PROCEDURE — 3017F COLORECTAL CA SCREEN DOC REV: CPT | Performed by: STUDENT IN AN ORGANIZED HEALTH CARE EDUCATION/TRAINING PROGRAM

## 2024-04-30 PROCEDURE — 3074F SYST BP LT 130 MM HG: CPT | Performed by: STUDENT IN AN ORGANIZED HEALTH CARE EDUCATION/TRAINING PROGRAM

## 2024-04-30 PROCEDURE — 1036F TOBACCO NON-USER: CPT | Performed by: STUDENT IN AN ORGANIZED HEALTH CARE EDUCATION/TRAINING PROGRAM

## 2024-04-30 PROCEDURE — 99214 OFFICE O/P EST MOD 30 MIN: CPT | Performed by: STUDENT IN AN ORGANIZED HEALTH CARE EDUCATION/TRAINING PROGRAM

## 2024-04-30 PROCEDURE — G8420 CALC BMI NORM PARAMETERS: HCPCS | Performed by: STUDENT IN AN ORGANIZED HEALTH CARE EDUCATION/TRAINING PROGRAM

## 2024-04-30 PROCEDURE — 3079F DIAST BP 80-89 MM HG: CPT | Performed by: STUDENT IN AN ORGANIZED HEALTH CARE EDUCATION/TRAINING PROGRAM

## 2024-04-30 PROCEDURE — G8427 DOCREV CUR MEDS BY ELIG CLIN: HCPCS | Performed by: STUDENT IN AN ORGANIZED HEALTH CARE EDUCATION/TRAINING PROGRAM

## 2024-04-30 RX ORDER — ASCORBIC ACID 250 MG
TABLET,CHEWABLE ORAL
COMMUNITY

## 2024-04-30 RX ORDER — BACLOFEN 5 MG/1
2.5 TABLET ORAL 2 TIMES DAILY
Qty: 10 TABLET | Refills: 0 | Status: SHIPPED | OUTPATIENT
Start: 2024-04-30 | End: 2024-05-10

## 2024-04-30 ASSESSMENT — ENCOUNTER SYMPTOMS
SORE THROAT: 0
COUGH: 0
VOMITING: 0
ABDOMINAL PAIN: 0
SHORTNESS OF BREATH: 0
SINUS PRESSURE: 0

## 2024-04-30 NOTE — PROGRESS NOTES
2024    Rosette Hartman (:  1965) is a 59 y.o. female, here for evaluation of the following medical concerns:  No chief complaint on file.    HPI  Chest pain  Seen by PCP  and diagnosed with strep pharyngitis and pleurisy  Prescribed methylprednisolone and cefuroxime   chest x-ray nonacute  Was also treated in December for pleurisy, treated with steroid and symptoms resolved  February symptoms returned, treated with prednisone, didn't help at all      Has symptoms with deep breaths or activity  Worse at nighttime  Pain is sharp in nature  Symptoms always present but intensifies at times  Central chest, lower ribs/upper ribs  No URI symptoms    Never smoker    Has similar symptoms 10 years ago, had extensive testing and finally cleared with two rounds of azithromycin and steroids    Started on Azithromycin      No improvement in symptoms    Review of Systems   Constitutional:  Negative for chills and fever.   HENT:  Negative for congestion, sinus pressure and sore throat.    Respiratory:  Negative for cough and shortness of breath.    Cardiovascular:  Positive for chest pain. Negative for palpitations.   Gastrointestinal:  Negative for abdominal pain and vomiting.   Musculoskeletal:  Negative for arthralgias and myalgias.   Skin:  Negative for rash and wound.   Neurological:  Negative for speech difficulty and light-headedness.   Psychiatric/Behavioral:  Negative for suicidal ideas. The patient is not nervous/anxious.        Prior to Visit Medications    Medication Sig Taking? Authorizing Provider   atorvastatin (LIPITOR) 10 MG tablet Take by mouth  Kailee Burnette MD   CALCIUM PO Calcium Active  Kailee Burnette MD   Cyanocobalamin (VITAMIN B-12 ER PO) Vitamin B12 Active  ProviderKailee MD   Pyridoxine HCl (VITAMIN B6) 100 MG TABS Take by mouth  Kailee Burnette MD   Zinc Sulfate (ZINC 15 PO) Take by mouth  Kailee Burnette MD   azithromycin

## 2024-05-07 ENCOUNTER — HOSPITAL ENCOUNTER (OUTPATIENT)
Dept: CT IMAGING | Age: 59
Discharge: HOME | End: 2024-05-09
Payer: COMMERCIAL

## 2024-05-07 DIAGNOSIS — R09.1 PLEURISY: ICD-10-CM

## 2024-05-07 PROCEDURE — 71250 CT THORAX DX C-: CPT

## 2024-05-09 ENCOUNTER — OFFICE VISIT (OUTPATIENT)
Dept: FAMILY MEDICINE CLINIC | Age: 59
End: 2024-05-09
Payer: COMMERCIAL

## 2024-05-09 VITALS
WEIGHT: 125 LBS | DIASTOLIC BLOOD PRESSURE: 82 MMHG | HEIGHT: 68 IN | SYSTOLIC BLOOD PRESSURE: 110 MMHG | HEART RATE: 78 BPM | OXYGEN SATURATION: 99 % | BODY MASS INDEX: 18.94 KG/M2

## 2024-05-09 DIAGNOSIS — E78.5 HYPERLIPIDEMIA, UNSPECIFIED HYPERLIPIDEMIA TYPE: ICD-10-CM

## 2024-05-09 DIAGNOSIS — E55.9 VITAMIN D DEFICIENCY: ICD-10-CM

## 2024-05-09 DIAGNOSIS — I10 PRIMARY HYPERTENSION: ICD-10-CM

## 2024-05-09 DIAGNOSIS — R07.81 RIB PAIN: Primary | ICD-10-CM

## 2024-05-09 DIAGNOSIS — N18.30 STAGE 3 CHRONIC KIDNEY DISEASE, UNSPECIFIED WHETHER STAGE 3A OR 3B CKD (HCC): ICD-10-CM

## 2024-05-09 DIAGNOSIS — R09.1 PLEURISY: ICD-10-CM

## 2024-05-09 PROCEDURE — 3079F DIAST BP 80-89 MM HG: CPT | Performed by: STUDENT IN AN ORGANIZED HEALTH CARE EDUCATION/TRAINING PROGRAM

## 2024-05-09 PROCEDURE — 3017F COLORECTAL CA SCREEN DOC REV: CPT | Performed by: STUDENT IN AN ORGANIZED HEALTH CARE EDUCATION/TRAINING PROGRAM

## 2024-05-09 PROCEDURE — 3074F SYST BP LT 130 MM HG: CPT | Performed by: STUDENT IN AN ORGANIZED HEALTH CARE EDUCATION/TRAINING PROGRAM

## 2024-05-09 PROCEDURE — 99214 OFFICE O/P EST MOD 30 MIN: CPT | Performed by: STUDENT IN AN ORGANIZED HEALTH CARE EDUCATION/TRAINING PROGRAM

## 2024-05-09 PROCEDURE — G8420 CALC BMI NORM PARAMETERS: HCPCS | Performed by: STUDENT IN AN ORGANIZED HEALTH CARE EDUCATION/TRAINING PROGRAM

## 2024-05-09 PROCEDURE — 1036F TOBACCO NON-USER: CPT | Performed by: STUDENT IN AN ORGANIZED HEALTH CARE EDUCATION/TRAINING PROGRAM

## 2024-05-09 PROCEDURE — G8427 DOCREV CUR MEDS BY ELIG CLIN: HCPCS | Performed by: STUDENT IN AN ORGANIZED HEALTH CARE EDUCATION/TRAINING PROGRAM

## 2024-05-09 RX ORDER — BACLOFEN 5 MG/1
2.5 TABLET ORAL 2 TIMES DAILY
Qty: 10 TABLET | Refills: 0 | Status: SHIPPED | OUTPATIENT
Start: 2024-05-09 | End: 2024-05-19

## 2024-05-09 ASSESSMENT — ENCOUNTER SYMPTOMS
ABDOMINAL PAIN: 0
SHORTNESS OF BREATH: 0
SINUS PRESSURE: 0
VOMITING: 0
SORE THROAT: 0
COUGH: 0

## 2024-05-09 NOTE — PROGRESS NOTES
2024    Rosette Hartman (:  1965) is a 59 y.o. female, here for evaluation of the following medical concerns:  Chief Complaint   Patient presents with    Chest Pain     States she is still having rib pain, ct came back normal. States the pain is the same, muscle relaxer's help a little.      HPI  Chest pain  Seen by PCP  and diagnosed with strep pharyngitis and pleurisy  Prescribed methylprednisolone and cefuroxime   chest x-ray nonacute  Was also treated in December for pleurisy, treated with steroid and symptoms resolved  February symptoms returned, treated with prednisone, didn't help at all      Has symptoms with deep breaths or activity  Worse at nighttime  Pain is sharp in nature  Symptoms always present but intensifies at times  Central chest, lower ribs/upper ribs  No URI symptoms    Never smoker    Has similar symptoms 10 years ago, had extensive testing and finally cleared with two rounds of azithromycin and steroids    Started on Azithromycin      No improvement in symptoms    Review of Systems   Constitutional:  Negative for chills and fever.   HENT:  Negative for congestion, sinus pressure and sore throat.    Respiratory:  Negative for cough and shortness of breath.    Cardiovascular:  Positive for chest pain. Negative for palpitations.   Gastrointestinal:  Negative for abdominal pain and vomiting.   Musculoskeletal:  Negative for arthralgias and myalgias.   Skin:  Negative for rash and wound.   Neurological:  Negative for speech difficulty and light-headedness.   Psychiatric/Behavioral:  Negative for suicidal ideas. The patient is not nervous/anxious.        Prior to Visit Medications    Medication Sig Taking? Authorizing Provider   Ascorbic Acid (VITAMIN C) 250 MG CHEW  Yes Provider, MD Kailee   baclofen (LIORESAL) 5 MG tablet Take 0.5 tablets by mouth 2 times daily for 10 days Yes Joslyn Alvarez DO   atorvastatin (LIPITOR) 10 MG tablet Take by mouth Yes

## 2024-05-15 ENCOUNTER — HOSPITAL ENCOUNTER (OUTPATIENT)
Dept: PHYSICAL THERAPY | Age: 59
Setting detail: THERAPIES SERIES
Discharge: HOME | End: 2024-05-15
Payer: COMMERCIAL

## 2024-05-15 PROCEDURE — 97110 THERAPEUTIC EXERCISES: CPT

## 2024-05-15 PROCEDURE — 97162 PT EVAL MOD COMPLEX 30 MIN: CPT

## 2024-05-15 ASSESSMENT — PAIN SCALES - GENERAL: PAINLEVEL_OUTOF10: 5

## 2024-05-15 ASSESSMENT — PAIN DESCRIPTION - PAIN TYPE: TYPE: ACUTE PAIN;CHRONIC PAIN

## 2024-05-15 ASSESSMENT — PAIN DESCRIPTION - LOCATION: LOCATION: RIB CAGE

## 2024-05-15 NOTE — PROGRESS NOTES
Minutes  Procedure Minutes: 30 eval     Timed Activity Minutes Units   Ther Ex 12 1     Electronically signed by Lupe Oliveros PT on 5/15/24 at 9:08 AM EDT

## 2024-05-15 NOTE — PLAN OF CARE
PHYSICAL THERAPY PLAN OF CARE   [facility]      [address], [address]   [place] [address]     Ph: [phone] Fax: [phone]      [] Certification  [] Recertification []  Plan of Care  [] Progress Note [] Discharge      Referring Provider: Joslyn Alvarez DO      From:  Lupe Oliveros PT   Patient: Rosette Hartman (59 y.o. female) : 1965 Date: 05/15/2024   Medical Diagnosis: Rib pain [R07.81] rib pain  Treatment Diagnosis: rib pain, postural abnormality, postural muscle weakness    Plan of Care/Certification Expiration Date: :     Progress Report Period from:  5/15/2024  to 5/15/2024    Visits to Date: 1 No Show: 0 Cancelled Appts: 0    OBJECTIVE:   Short Term Goals - Time Frame for Short Term Goals: 2 wks    Goals Current/Discharge status  Status   Short Term Goal 1: Independent with HEP to promote home management of symptoms   Need for HEP and education    New   Short Term Goal 2: Report 25% redcution in symptoms with </= 7/10 worst with activity   1-10/10 pain, 5/10 at rest     New     Long Term Goals - Time Frame for Long Term Goals : 4 wks  Goals Current/ Discharge status Status   Long Term Goal 1: Decrease pain by 50% </= 5/10 pain at worst with improved sleep  1-10/10 pain, 5/10 at rest   New   Long Term Goal 2: Improve postural awareness and strategies to prevent worsening of posture  pt with moderate mid thoracic kyphosis    New   Long Term Goal 3: Improve UE strength >/= 4+/5 to improve ease with ADLs  Strength LUE  L Shoulder Flexion: 4/5  L Shoulder Extension: 4+/5  L Shoulder ABduction: 5/5  L Shoulder Internal Rotation: 4+/5  L Shoulder External Rotation: 4+/5  L Elbow Flexion: 5/5  L Elbow Extension: 5/5  L Middle Trapezius: 3-/5  L Rhomboids: 3-/5  L Lower Trapezius: 3-/5  Strength RUE  R Shoulder Flexion: 4/5  R Shoulder Extension: 4+/5  R Shoulder ABduction: 5/5  R Shoulder Internal Rotation: 4+/5  R Shoulder External Rotation: 4+/5  R Elbow

## 2024-05-20 DIAGNOSIS — N18.30 STAGE 3 CHRONIC KIDNEY DISEASE, UNSPECIFIED WHETHER STAGE 3A OR 3B CKD (HCC): ICD-10-CM

## 2024-05-20 DIAGNOSIS — I10 PRIMARY HYPERTENSION: ICD-10-CM

## 2024-05-20 DIAGNOSIS — E55.9 VITAMIN D DEFICIENCY: ICD-10-CM

## 2024-05-20 DIAGNOSIS — E78.5 HYPERLIPIDEMIA, UNSPECIFIED HYPERLIPIDEMIA TYPE: ICD-10-CM

## 2024-05-20 LAB
ALBUMIN SERPL-MCNC: 4.7 G/DL (ref 3.5–4.6)
ALP SERPL-CCNC: 76 U/L (ref 40–130)
ALT SERPL-CCNC: 19 U/L (ref 0–33)
ANION GAP SERPL CALCULATED.3IONS-SCNC: 14 MEQ/L (ref 9–15)
AST SERPL-CCNC: 29 U/L (ref 0–35)
BILIRUB SERPL-MCNC: 0.7 MG/DL (ref 0.2–0.7)
BUN SERPL-MCNC: 26 MG/DL (ref 8–23)
CALCIUM SERPL-MCNC: 10.1 MG/DL (ref 8.5–9.9)
CHLORIDE SERPL-SCNC: 102 MEQ/L (ref 95–107)
CHOLEST SERPL-MCNC: 175 MG/DL (ref 0–199)
CO2 SERPL-SCNC: 26 MEQ/L (ref 20–31)
CREAT SERPL-MCNC: 1.02 MG/DL (ref 0.5–0.9)
ERYTHROCYTE [DISTWIDTH] IN BLOOD BY AUTOMATED COUNT: 12.7 % (ref 11.5–14.5)
GLOBULIN SER CALC-MCNC: 2.7 G/DL (ref 2.3–3.5)
GLUCOSE SERPL-MCNC: 87 MG/DL (ref 70–99)
HCT VFR BLD AUTO: 40 % (ref 37–47)
HDLC SERPL-MCNC: 86 MG/DL (ref 40–59)
HGB BLD-MCNC: 13.3 G/DL (ref 12–16)
LDLC SERPL CALC-MCNC: 79 MG/DL (ref 0–129)
MCH RBC QN AUTO: 31.4 PG (ref 27–31.3)
MCHC RBC AUTO-ENTMCNC: 33.3 % (ref 33–37)
MCV RBC AUTO: 94.3 FL (ref 79.4–94.8)
PLATELET # BLD AUTO: 196 K/UL (ref 130–400)
POTASSIUM SERPL-SCNC: 4.1 MEQ/L (ref 3.4–4.9)
PROT SERPL-MCNC: 7.4 G/DL (ref 6.3–8)
RBC # BLD AUTO: 4.24 M/UL (ref 4.2–5.4)
SODIUM SERPL-SCNC: 142 MEQ/L (ref 135–144)
TRIGL SERPL-MCNC: 52 MG/DL (ref 0–150)
WBC # BLD AUTO: 4.1 K/UL (ref 4.8–10.8)

## 2024-05-21 ENCOUNTER — HOSPITAL ENCOUNTER (OUTPATIENT)
Dept: PHYSICAL THERAPY | Age: 59
Setting detail: THERAPIES SERIES
Discharge: HOME | End: 2024-05-21
Payer: COMMERCIAL

## 2024-05-21 LAB — VITAMIN D 25-HYDROXY: 62.1 NG/ML (ref 30–100)

## 2024-05-21 PROCEDURE — 97110 THERAPEUTIC EXERCISES: CPT

## 2024-05-21 ASSESSMENT — PAIN DESCRIPTION - LOCATION: LOCATION: RIB CAGE

## 2024-05-21 ASSESSMENT — PAIN DESCRIPTION - ORIENTATION: ORIENTATION: LEFT;RIGHT

## 2024-05-21 ASSESSMENT — PAIN SCALES - GENERAL: PAINLEVEL_OUTOF10: 3

## 2024-05-21 NOTE — PROGRESS NOTES
[facility]  Outpatient Physical Therapy    Treatment Note        Date: 2024  Patient: Rosette Hartman  : 1965   Confirmed: Yes  MRN: [id number]  Referring Provider: Joslyn Alvarez DO Secondary Referring Provider (If applicable):     Medical Diagnosis: Rib pain [R07.81]    Treatment Diagnosis: rib pain, postural abnormality, postural muscle weakness    Visit Information:  Insurance: Payor: MED[employer] / Plan: MED[employer] [address] / Product Type: *No Product type* /   PT Visit Information  Onset Date:  (2023)  PT Insurance Information: Medical [place]  Total # of Visits Approved: 20  Total # of Visits to Date: 2  No Show: 0  Canceled Appointment: 0  Progress Note Counter:     Subjective Information:  Subjective: Pt reporting 3-4/10 pain at arrival in b/l ribs stating \"I've been doing the exercises at least once a day. They seem to be ok.\"Pt reports having 1 more pill of Baclofen and expected to F/U w/ MD next week.  HEP Compliance:  [x] Good [] Fair [] Poor [] Reports not doing due to:    Pain Screening  Patient Currently in Pain: Yes  Pain Assessment: 0-10  Pain Level: 3 (3-4)  Pain Location: Rib cage  Pain Orientation: Left, Right    Treatment:  Exercises:  Exercises  Exercise 1: diaphragmatic breathing - unable to coordinate in semi-reclined or sitting  Exercise 2: posture exs x10 with VC/TCs for proper technique  Exercise 3: seated anterior pelvic tilt with VCs/ TCs for proper technique  Exercise 4: supine pec stretch on wedge 10\"x10  Exercise 5: seated Pball all planes (hip circles, APT's) x15 ea  Exercise 6: * prone scap over ball  Exercise 7: * lat X pulls  Exercise 8: * Y on wall lower trap  Exercise 9: * standing wall ext  Exercise 10: LTR 10\"x10  Exercise 11: Closed book and thoracic open book 5\"x10 ea b/l  Exercise 12: UBE L1 4 min (retro), doorway stretch 15\"x3  Exercise 20: HEP: closed/open book, diaphramatic breathing  Treatment

## 2024-05-23 ENCOUNTER — HOSPITAL ENCOUNTER (OUTPATIENT)
Dept: PHYSICAL THERAPY | Age: 59
Setting detail: THERAPIES SERIES
Discharge: HOME | End: 2024-05-23
Payer: COMMERCIAL

## 2024-05-23 PROCEDURE — 97110 THERAPEUTIC EXERCISES: CPT

## 2024-05-23 ASSESSMENT — PAIN SCALES - GENERAL: PAINLEVEL_OUTOF10: 3

## 2024-05-23 ASSESSMENT — PAIN DESCRIPTION - PAIN TYPE: TYPE: ACUTE PAIN;CHRONIC PAIN

## 2024-05-23 ASSESSMENT — PAIN DESCRIPTION - LOCATION: LOCATION: RIB CAGE

## 2024-05-23 ASSESSMENT — PAIN DESCRIPTION - DESCRIPTORS: DESCRIPTORS: SHARP

## 2024-05-23 ASSESSMENT — PAIN DESCRIPTION - ORIENTATION: ORIENTATION: RIGHT;LEFT

## 2024-05-23 NOTE — PROGRESS NOTES
Plan:  Frequency/Duration:  Plan  Plan Frequency: 2x/ wk  Plan weeks: 4  Current Treatment Recommendations: Strengthening, ROM, Balance training, Functional mobility training, Neuromuscular re-education, Home exercise program, Manual, Safety education & training, Patient/Caregiver education & training, Equipment evaluation, education, & procurement, Modalities  Modalities: Heat/Cold  Pt to continue current HEP.  See objective section for any therapeutic exercise changes, additions or modifications this date.    Therapy Time:      PT Individual Minutes  Time In: 1515  Time Out: 1555  Minutes: 40  Timed Code Treatment Minutes: 40 Minutes  Procedure Minutes: n/a  Timed Activity Minutes Units   Ther Ex 40 3     Electronically signed by Kanwal Trinidad PTA on 5/23/24 at 5:01 PM EDT

## 2024-05-28 ENCOUNTER — HOSPITAL ENCOUNTER (OUTPATIENT)
Dept: PHYSICAL THERAPY | Age: 59
Setting detail: THERAPIES SERIES
Discharge: HOME | End: 2024-05-28
Payer: COMMERCIAL

## 2024-05-28 PROCEDURE — 97110 THERAPEUTIC EXERCISES: CPT

## 2024-05-28 ASSESSMENT — PAIN DESCRIPTION - LOCATION: LOCATION: RIB CAGE

## 2024-05-28 ASSESSMENT — PAIN DESCRIPTION - ORIENTATION: ORIENTATION: RIGHT;LEFT

## 2024-05-28 NOTE — PROGRESS NOTES
[facility]  Outpatient Physical Therapy    Treatment Note        Date: 2024  Patient: Rosette Hartman  : 1965   Confirmed: Yes  MRN: [id number]  Referring Provider: Joslyn Alvarez DO   Secondary Referring Provider (If applicable):     Medical Diagnosis: No admission diagnoses are documented for this encounter.    Treatment Diagnosis: rib pain, postural abnormality, postural muscle weakness    Visit Information:  Insurance: Payor: MED[employer] M[employer]UAL / Plan: MEDICAL M[employer]UAL [address] / Product Type: *No Product type* /   PT Visit Information  Onset Date:  (2023)  PT Insurance Information: Medical [place]  Total # of Visits Approved: 20  Total # of Visits to Date: 4  No Show: 0  Canceled Appointment: 0  Progress Note Counter:     Subjective Information:  Subjective: Pt w/ increased soreness after LV, pt states possibly due to no longer taking muscle relaxers  HEP Compliance:  [x] Good [] Fair [] Poor [] Reports not doing due to:    Pain Screening  Patient Currently in Pain: Yes  Pain Assessment: 0-10  Pain Location: Rib cage  Pain Orientation: Right, Left    Treatment:  Exercises:  Exercises  Exercise 1: diaphragmatic breathing - unable to coordinate in semi-reclined or sitting  Exercise 2: posture exs x10 with VC/TCs for proper technique  Exercise 4: supine pec stretch on wedge 10\"x10  Exercise 5: seated Pball all planes (hip circles, APT's,lat) x15 ea  Exercise 6: * prone scap over ball (not able to perform)  Exercise 9: standing w/ back against wall 3x30 sec (w/ diaphragmatic breathing)  Exercise 11: Closed book and thoracic open book 5\"x10 ea b/l  Exercise 12: UBE L1 4 min (retro), doorway stretch 15\"x3  Exercise 20: HEP: closed/open book, diaphramatic breathing  Treatment Reasoning  Limitations addressed: Strength, Mobility, Flexibility, Activity tolerance, Posture      *Indicates exercise, modality, or manual techniques to be initiated when

## 2024-05-30 ENCOUNTER — HOSPITAL ENCOUNTER (OUTPATIENT)
Dept: PHYSICAL THERAPY | Age: 59
Setting detail: THERAPIES SERIES
Discharge: HOME | End: 2024-05-30
Payer: COMMERCIAL

## 2024-05-30 PROCEDURE — 97110 THERAPEUTIC EXERCISES: CPT

## 2024-05-30 ASSESSMENT — PAIN DESCRIPTION - PAIN TYPE: TYPE: CHRONIC PAIN

## 2024-05-30 ASSESSMENT — PAIN DESCRIPTION - ORIENTATION: ORIENTATION: RIGHT;LEFT

## 2024-05-30 ASSESSMENT — PAIN DESCRIPTION - DESCRIPTORS: DESCRIPTORS: SHARP

## 2024-05-30 ASSESSMENT — PAIN DESCRIPTION - LOCATION: LOCATION: RIB CAGE

## 2024-05-30 NOTE — PROGRESS NOTES
appropriate    Objective Measures:         STG 1 Current Status:: compliance w/ HEP  STG 2 Current Status:: 10/10 pain \"comes out of no where\"     Assessment:   Body Structures, Functions, Activity Limitations Requiring Skilled Therapeutic Intervention: Decreased ROM, Decreased strength, Decreased posture, Increased pain  Assessment: Pt states decreased energy and needing to take breaks when performing yard work and house hold tasks.  Painful palpation to Rt/Lt lateral rib cage, pt reports typical spot and moves distal as well.  Continued with current exercise program focusing on fexibility and mobility and improving posture.  Intiaited TA bacing to improve core strength and slef awareness to abdominal musculature for carryover with diaphgramatic breathing. Pt requiring increased verbal cues and tactile imput to perform with decreased contraction.  Initiated cat/cow to improve lumbar mobility with good tolerance and without increase in pain.  Contniue progressing towards LTG.  Treatment Diagnosis: rib pain, postural abnormality, postural muscle weakness  Therapy Prognosis: Good     Post-Pain Assessment:       Pain Rating (0-10 pain scale):   3/10   Location and pain description same as pre-treatment unless indicated.   Action: [] NA   [x] Perform HEP  [] Meds as prescribed  [] Modalities as prescribed   [] Call Physician     GOALS   Patient Goal(s): Patient Goals : return to previous function, able to be active without increased pain    Short Term Goals Completed by 2 wks Goal Status   STG 1 Independent with HEP to promote home management of symptoms In progress   STG 2 Report 25% redcution in symptoms with </= 7/10 worst with activity         Long Term Goals Completed by 4 wks Goal Status   LTG 1 Decrease pain by 50% </= 5/10 pain at worst with improved sleep In progress   LTG 2 Improve postural awareness and strategies to prevent worsening of posture In progress   LTG 3 Improve UE strength >/= 4+/5 to improve ease

## 2024-06-04 ENCOUNTER — HOSPITAL ENCOUNTER (OUTPATIENT)
Dept: PHYSICAL THERAPY | Age: 59
Setting detail: THERAPIES SERIES
Discharge: HOME | End: 2024-06-04
Payer: COMMERCIAL

## 2024-06-04 ENCOUNTER — OFFICE VISIT (OUTPATIENT)
Dept: FAMILY MEDICINE CLINIC | Age: 59
End: 2024-06-04
Payer: COMMERCIAL

## 2024-06-04 VITALS
SYSTOLIC BLOOD PRESSURE: 122 MMHG | HEART RATE: 64 BPM | WEIGHT: 124 LBS | BODY MASS INDEX: 18.79 KG/M2 | HEIGHT: 68 IN | TEMPERATURE: 97.1 F | DIASTOLIC BLOOD PRESSURE: 74 MMHG | OXYGEN SATURATION: 100 %

## 2024-06-04 DIAGNOSIS — R31.9 HEMATURIA, UNSPECIFIED TYPE: ICD-10-CM

## 2024-06-04 DIAGNOSIS — Z00.00 WELL ADULT EXAM: Primary | ICD-10-CM

## 2024-06-04 DIAGNOSIS — E78.5 HYPERLIPIDEMIA, UNSPECIFIED HYPERLIPIDEMIA TYPE: ICD-10-CM

## 2024-06-04 DIAGNOSIS — I10 PRIMARY HYPERTENSION: ICD-10-CM

## 2024-06-04 DIAGNOSIS — N18.30 STAGE 3 CHRONIC KIDNEY DISEASE, UNSPECIFIED WHETHER STAGE 3A OR 3B CKD (HCC): ICD-10-CM

## 2024-06-04 LAB
BILIRUBIN, POC: NORMAL
BLOOD URINE, POC: 200
CLARITY, POC: NORMAL
COLOR, POC: YELLOW
GLUCOSE URINE, POC: NORMAL
KETONES, POC: NORMAL
LEUKOCYTE EST, POC: NORMAL
NITRITE, POC: NORMAL
PH, POC: 6
PROTEIN, POC: 0.15
SPECIFIC GRAVITY, POC: 1.02
UROBILINOGEN, POC: 3.5

## 2024-06-04 PROCEDURE — 3074F SYST BP LT 130 MM HG: CPT | Performed by: STUDENT IN AN ORGANIZED HEALTH CARE EDUCATION/TRAINING PROGRAM

## 2024-06-04 PROCEDURE — 81003 URINALYSIS AUTO W/O SCOPE: CPT | Performed by: STUDENT IN AN ORGANIZED HEALTH CARE EDUCATION/TRAINING PROGRAM

## 2024-06-04 PROCEDURE — 97112 NEUROMUSCULAR REEDUCATION: CPT

## 2024-06-04 PROCEDURE — 97110 THERAPEUTIC EXERCISES: CPT

## 2024-06-04 PROCEDURE — 99396 PREV VISIT EST AGE 40-64: CPT | Performed by: STUDENT IN AN ORGANIZED HEALTH CARE EDUCATION/TRAINING PROGRAM

## 2024-06-04 PROCEDURE — 97140 MANUAL THERAPY 1/> REGIONS: CPT

## 2024-06-04 PROCEDURE — 3078F DIAST BP <80 MM HG: CPT | Performed by: STUDENT IN AN ORGANIZED HEALTH CARE EDUCATION/TRAINING PROGRAM

## 2024-06-04 ASSESSMENT — ENCOUNTER SYMPTOMS
SORE THROAT: 0
SINUS PRESSURE: 0
SHORTNESS OF BREATH: 0
COUGH: 0
VOMITING: 0
ABDOMINAL PAIN: 0

## 2024-06-04 ASSESSMENT — PAIN SCALES - GENERAL: PAINLEVEL_OUTOF10: 3

## 2024-06-04 ASSESSMENT — PAIN DESCRIPTION - ORIENTATION: ORIENTATION: RIGHT;LEFT

## 2024-06-04 ASSESSMENT — PAIN DESCRIPTION - LOCATION: LOCATION: RIB CAGE

## 2024-06-04 ASSESSMENT — PAIN DESCRIPTION - PAIN TYPE: TYPE: CHRONIC PAIN

## 2024-06-04 NOTE — PROGRESS NOTES
[facility]  Outpatient Physical Therapy    Treatment Note        Date: 2024  Patient: Rosette Hartman  : 1965   Confirmed: Yes  MRN: [id number]  Referring Provider: Joslyn Alvarez DO   Secondary Referring Provider (If applicable):     Medical Diagnosis: Pleurodynia [R07.81]    Treatment Diagnosis: rib pain, postural abnormality, postural muscle weakness    Visit Information:  Insurance: Payor: MEDICAL M[employer]UAL / Plan: MEDICAL MUTUAL [address] / Product Type: *No Product type* /   PT Visit Information  Onset Date:  (2023)  PT Insurance Information: Medical [place]  Total # of Visits Approved: 20  Total # of Visits to Date: 6  No Show: 0  Canceled Appointment: 0  Progress Note Counter:     Subjective Information:  Subjective: Pt states \"Ribs still hurt, definitely better than it was. I'm able to do more than I normally do.\"  HEP Compliance:  [x] Good [] Fair [] Poor [] Reports not doing due to:    Pain Screening  Patient Currently in Pain: Yes  Pain Assessment: 0-10  Pain Level: 3  Post Treatment Pain Level: 6  Pain Type: Chronic pain  Pain Location: Rib cage  Pain Orientation: Right, Left    Treatment:  Exercises:  Exercises  Exercise 1: diaphragmatic breathing - unable to coordinate in semi-reclined or sitting  Exercise 2: posture exs x15  with VC/TCs for proper technique, seated thoracic ext w/ towel 5\"x10  Exercise 3: seated anterior pelvic tilt with VCs/ TCs for proper technique  Exercise 4: supine pec stretch flat 30 sec x3  Exercise 5: seated Pball all planes (hip circles, APT's,lat) x15 ea  Exercise 6: Supine chest pulls x10 limited range  Exercise 7: Supine wand flexion 5\"x10, to promote thoracic ext  Exercise 8: TB rows/lats x10 RTB, cues to maintain chin tuck  Exercise 10: Seated UT/LS stretches 20\"x3 ea b/l  Exercise 11: Closed book and thoracic open book 5\"x10 ea (limited range) b/l  Exercise 12: UBE L1 4 min (retro), doorway stretch

## 2024-06-04 NOTE — PROGRESS NOTES
2024    Rosette Hartman (:  1965) is a 59 y.o. female, here for evaluation of the following medical concerns:  Chief Complaint   Patient presents with    Annual Exam     Patient presents for a physical and to review blood work      HPI  Annual physical  Review labs    Hematuria  Noticed a few episodes of hematuria the other day  Symptoms have since resolved  No flank pain, dysuria, urgency/frequency, fevers/chills  Hx kidney stones    Review of Systems   Constitutional:  Negative for chills and fever.   HENT:  Negative for congestion, sinus pressure and sore throat.    Respiratory:  Negative for cough and shortness of breath.    Cardiovascular:  Negative for chest pain and palpitations.   Gastrointestinal:  Negative for abdominal pain and vomiting.   Genitourinary:  Positive for hematuria. Negative for difficulty urinating, dysuria, flank pain, frequency and urgency.   Musculoskeletal:  Negative for arthralgias and myalgias.   Skin:  Negative for rash and wound.   Neurological:  Negative for speech difficulty and light-headedness.   Psychiatric/Behavioral:  Negative for suicidal ideas. The patient is not nervous/anxious.      Prior to Visit Medications    Medication Sig Taking? Authorizing Provider   Ascorbic Acid (VITAMIN C) 250 MG CHEW  Yes Kailee Burnette MD   atorvastatin (LIPITOR) 10 MG tablet Take by mouth Yes Kailee Burnette MD   CALCIUM PO Calcium Active Yes Kailee Burnette MD   Cyanocobalamin (VITAMIN B-12 ER PO) Vitamin B12 Active Yes Kailee Burnette MD   Pyridoxine HCl (VITAMIN B6) 100 MG TABS Take by mouth Yes Kailee Burnette MD   Zinc Sulfate (ZINC 15 PO) Take by mouth Yes Kailee Burnette MD        There are no discontinued medications.    No Known Allergies    Past Medical History:   Diagnosis Date    Abdominal pain     Chronic kidney disease     Hypertension     Kidney stone on left side 2017    Osteoarthritis     Postmenopausal        No

## 2024-06-05 LAB — BACTERIA UR CULT: NORMAL

## 2024-06-06 ENCOUNTER — HOSPITAL ENCOUNTER (OUTPATIENT)
Dept: PHYSICAL THERAPY | Age: 59
Setting detail: THERAPIES SERIES
Discharge: HOME | End: 2024-06-06
Payer: COMMERCIAL

## 2024-06-06 PROCEDURE — 97140 MANUAL THERAPY 1/> REGIONS: CPT

## 2024-06-06 PROCEDURE — 97110 THERAPEUTIC EXERCISES: CPT

## 2024-06-06 ASSESSMENT — PAIN DESCRIPTION - ORIENTATION: ORIENTATION: LEFT;RIGHT

## 2024-06-06 ASSESSMENT — PAIN DESCRIPTION - DESCRIPTORS: DESCRIPTORS: SHARP

## 2024-06-06 ASSESSMENT — PAIN DESCRIPTION - PAIN TYPE: TYPE: CHRONIC PAIN

## 2024-06-06 ASSESSMENT — PAIN DESCRIPTION - LOCATION: LOCATION: RIB CAGE

## 2024-06-06 ASSESSMENT — PAIN SCALES - GENERAL: PAINLEVEL_OUTOF10: 3

## 2024-06-06 NOTE — PROGRESS NOTES
[facility]  Outpatient Physical Therapy    Treatment Note        Date: 2024  Patient: Rosette Hartman  : 1965   Confirmed: Yes  MRN: [id number]  Referring Provider: Joslyn Alvarez DO Secondary Referring Provider (If applicable):     Medical Diagnosis: Pleurodynia [R07.81]    Treatment Diagnosis: rib pain, postural abnormality, postural muscle weakness    Visit Information:  Insurance: Payor: MEDICAL M[employer]UAL / Plan: MEDICAL M[employer]UAL [address] / Product Type: *No Product type* /   PT Visit Information  Onset Date:  (2023)  PT Insurance Information: Medical [place]  Total # of Visits Approved: 20  Total # of Visits to Date: 7  No Show: 0  Canceled Appointment: 0  Progress Note Counter:     Subjective Information:  Subjective: Pt states \"I can still feel it bur it's better than it was.\"  HEP Compliance:  [x] Good [] Fair [] Poor [] Reports not doing due to:    Pain Screening  Patient Currently in Pain: Yes  Pain Assessment: 0-10  Pain Level: 3  Pain Type: Chronic pain  Pain Location: Rib cage  Pain Orientation: Left, Right  Pain Descriptors: Sharp    Treatment:  Exercises:  Exercises  Exercise 1: diaphragmatic breathing - unable to coordinate in semi-reclined or sitting  Exercise 2: posture exs x15  with VC/TCs for proper technique, seated thoracic ext w/ towel 5\"x10  Exercise 3: Doorway streches (low, mid high) 15\"x3 ea  Exercise 5: Pball walk up wall x10  Exercise 6: Supine chest pulls x10 limited range  Exercise 8: TB rows/lats 2x10 RTB, cues to maintain chin tuck  Exercise 9: standing w/ back against wall 3x30 sec (w/ diaphragmatic breathing)  Exercise 10: Seated UT/LS stretches 20\"x3 ea b/l  Exercise 11: Closed book and thoracic open book 5\"x10 ea (limited range) b/l  Exercise 12: UBE L1 4 min (retro), doorway stretch 15\"x3  Exercise 20: HEP: wall stands, TB rows/lats (YTB provided)  Treatment Reasoning  Limitations addressed: Strength, Mobility,

## 2024-06-11 ENCOUNTER — HOSPITAL ENCOUNTER (OUTPATIENT)
Dept: PHYSICAL THERAPY | Age: 59
Setting detail: THERAPIES SERIES
Discharge: HOME | End: 2024-06-11
Payer: COMMERCIAL

## 2024-06-11 PROCEDURE — 97110 THERAPEUTIC EXERCISES: CPT

## 2024-06-11 ASSESSMENT — PAIN SCALES - GENERAL: PAINLEVEL_OUTOF10: 3

## 2024-06-11 ASSESSMENT — PAIN DESCRIPTION - ORIENTATION: ORIENTATION: LEFT;RIGHT;UPPER;LOWER

## 2024-06-11 ASSESSMENT — PAIN DESCRIPTION - PAIN TYPE: TYPE: CHRONIC PAIN

## 2024-06-11 ASSESSMENT — PAIN DESCRIPTION - LOCATION: LOCATION: RIB CAGE

## 2024-06-11 NOTE — PROGRESS NOTES
[facility]  Outpatient Physical Therapy    Treatment Note        Date: 2024  Patient: Rosette Hartman  : 1965   Confirmed: Yes  MRN: [id number]  Referring Provider: Joslyn Alvarez DO Secondary Referring Provider (If applicable):     Medical Diagnosis: Pleurodynia [R07.81]    Treatment Diagnosis: rib pain, postural abnormality, postural muscle weakness    Visit Information:  Insurance: Payor: MEDICAL MUTUAL / Plan: MEDICAL MUTUAL [address] / Product Type: *No Product type* /   PT Visit Information  Onset Date:  (2023)  PT Insurance Information: Medical [place]  Total # of Visits Approved: 20  Total # of Visits to Date: 8  No Show: 0  Canceled Appointment: 0  Progress Note Counter:     Subjective Information:  Subjective: pt states doing yard work over the weekend and taking Tylenol prior to yard with improved tolerance after yard work  HEP Compliance:  [x] Good [] Fair [] Poor [] Reports not doing due to:    Pain Screening  Patient Currently in Pain: Yes  Pain Assessment: 0-10  Pain Level: 3  Pain Type: Chronic pain  Pain Location: Rib cage  Pain Orientation: Left, Right, Upper, Lower    Treatment:  Exercises:  Exercises  Exercise 2: posture exs x15  with VC/TCs for proper technique, seated thoracic ext w/ towel 5\"x10  Exercise 3: Doorway streches (low, mid high) 15\"x3 ea  Exercise 5: Pball walk up wall x10  Exercise 6: Supine chest pulls w/ YTB x10 limited range  Exercise 7: chin tuck w/ ball at wall x10  Exercise 8: TB rows/lats 2x10 RTB, cues to maintain chin tuck  Exercise 9: MMT  Exercise 10: Seated UT/LS stretches 20\"x3 ea b/l  Exercise 12: UBE L1 4 min (retro), doorway stretch 15\"x3  Exercise 20: HEP: wall stands, TB rows/lats (YTB provided)  Treatment Reasoning  Limitations addressed: Strength, Mobility, Flexibility, Activity tolerance, Posture    Manual:   Manual Therapy  Soft Tissue Mobilizaton: paraspinals w/ TBall (gentle) - thoracic and

## 2024-06-11 NOTE — PROGRESS NOTES
PHYSICAL THERAPY PLAN OF CARE   [facility]      [address], [address]   [place] [address]     Ph: [phone] Fax: [phone]      [] Certification  [] Recertification []  Plan of Care  [x] Progress Note [] Discharge      Referring Provider: Joslyn Alvarez DO      From:  Adrienne Oliveros PT  Patient: Rosette Hartman (59 y.o. female) : 1965 Date: 2024   Medical Diagnosis: Pleurodynia [R07.81]    Treatment Diagnosis: rib pain, postural abnormality, postural muscle weakness    Progress Report Period from: 5/15/2024   to 2024    Visits to Date: 8 No Show: 0 Cancelled Appts: 0    OBJECTIVE:   Short Term Goals - Time Frame for Short Term Goals: 2 wks    Goals Current/Discharge status  Status   Short Term Goal 1: Independent with HEP to promote home management of symptoms  STG 1 Current Status:: compliance w/ HEP   Met   Short Term Goal 2: Report 25% redcution in symptoms with </= 7/10 worst with activity  STG 2 Current Status:: 8/10 at most, reprts 75% reduction in sx   Partially met, in progress     Long Term Goals - Time Frame for Long Term Goals : 4 wks  Goals Current/ Discharge status Status   Long Term Goal 1: Decrease pain by 50% </= 5/10 pain at worst with improved sleep LTG 1 Current Status:: 6/10 with sleeping   Partially Met, In progress   Long Term Goal 2: Improve postural awareness and strategies to prevent worsening of posture LTG 2 Current Status:: pt w/ improved postural awareness, still requires cues during treatment session   Met   Long Term Goal 3: Improve UE strength >/= 4+/5 to improve ease with ADLs Strength LUE  L Shoulder Flexion: 4+/5  L Shoulder Extension: 4+/5  L Shoulder ABduction: 5/5  L Shoulder Internal Rotation: 4+/5  L Shoulder External Rotation: 4+/5  L Elbow Flexion: 5/5  L Elbow Extension: 5/5  L Middle Trapezius: 3-/5  L Rhomboids: 3-/5  L Lower Trapezius: 3-/5  Strength RUE  R Shoulder Flexion: 4+/5  R Shoulder Extension:

## 2024-06-13 ENCOUNTER — HOSPITAL ENCOUNTER (OUTPATIENT)
Dept: PHYSICAL THERAPY | Age: 59
Setting detail: THERAPIES SERIES
Discharge: HOME | End: 2024-06-13
Payer: COMMERCIAL

## 2024-06-13 PROCEDURE — 97140 MANUAL THERAPY 1/> REGIONS: CPT

## 2024-06-13 PROCEDURE — 97110 THERAPEUTIC EXERCISES: CPT

## 2024-06-13 ASSESSMENT — PAIN DESCRIPTION - DESCRIPTORS: DESCRIPTORS: SHARP

## 2024-06-13 ASSESSMENT — PAIN DESCRIPTION - PAIN TYPE: TYPE: CHRONIC PAIN

## 2024-06-13 ASSESSMENT — PAIN SCALES - GENERAL: PAINLEVEL_OUTOF10: 3

## 2024-06-13 ASSESSMENT — PAIN DESCRIPTION - ORIENTATION: ORIENTATION: RIGHT;LEFT;MID

## 2024-06-13 ASSESSMENT — PAIN DESCRIPTION - LOCATION: LOCATION: RIB CAGE

## 2024-06-13 NOTE — PROGRESS NOTES
[facility]  Outpatient Physical Therapy    Treatment Note        Date: 2024  Patient: Rosette Hartman  : 1965   Confirmed: Yes  MRN: [id number]  Referring Provider: Joslyn Alvarez DO Secondary Referring Provider (If applicable):     Medical Diagnosis: Pleurodynia [R07.81]    Treatment Diagnosis: rib pain, postural abnormality, postural muscle weakness    Visit Information:  Insurance: Payor: MED[employer] M[employer]UAL / Plan: MED[employer] M[employer] [address] / Product Type: *No Product type* /   PT Visit Information  PT Insurance Information: Medical [place]  Total # of Visits Approved: 20  Total # of Visits to Date: 9  No Show: 0  Canceled Appointment: 0  Progress Note Counter: 1/3    Subjective Information:  Subjective: Patient reports compliance with HEP.  HEP Compliance:  [x] Good [] Fair [] Poor [] Reports not doing due to:    Pain Screening  Patient Currently in Pain: Yes  Pain Assessment: 0-10  Pain Level: 3  Pain Type: Chronic pain  Pain Location: Rib cage  Pain Orientation: Right, Left, Mid  Pain Descriptors: Sharp    Treatment:  Exercises:  Exercises  Exercise 2: posture exs x15  with VC/TCs for proper technique, seated thoracic ext w/ towel 5\"x10  Exercise 5: Pball walk up wall x10  Exercise 8: TB rows/lats 2x15 RTB, cues to maintain chin tuck  Exercise 10: Seated UT/LS stretches 30\"x3 ea b/l  Exercise 12: UBE L1 4 min (retro), doorway stretch 15\"x3  Exercise 13: Supine T 30 seconds, supine with folded towel along spine to promote trunk neutral  Exercise 20: HEP: wall stands, TB rows/lats (YTB provided)  Treatment Reasoning  Limitations addressed: Strength, Mobility, Flexibility, Activity tolerance, Posture    Manual:   Manual Therapy  Joint Mobilization: gentle scap retraction/elevation, thoracic extension/transverse rotation  Soft Tissue Mobilizaton: stm, cfm to labert paraspinals setaed  Other: x10 min manual  Treatment Reasoning  Limitations addressed: Joint

## 2024-06-17 ENCOUNTER — HOSPITAL ENCOUNTER (OUTPATIENT)
Dept: PHYSICAL THERAPY | Age: 59
Setting detail: THERAPIES SERIES
Discharge: HOME | End: 2024-06-17
Payer: COMMERCIAL

## 2024-06-17 PROCEDURE — 97140 MANUAL THERAPY 1/> REGIONS: CPT

## 2024-06-17 PROCEDURE — 97110 THERAPEUTIC EXERCISES: CPT

## 2024-06-17 ASSESSMENT — PAIN DESCRIPTION - LOCATION: LOCATION: RIB CAGE

## 2024-06-17 ASSESSMENT — PAIN DESCRIPTION - ORIENTATION: ORIENTATION: RIGHT;LEFT

## 2024-06-17 ASSESSMENT — PAIN SCALES - GENERAL: PAINLEVEL_OUTOF10: 2

## 2024-06-17 NOTE — PROGRESS NOTES
[facility]  Outpatient Physical Therapy    Treatment Note        Date: 2024  Patient: Rosette Hartman  : 1965   Confirmed: Yes  MRN: [id number]  Referring Provider: Joslyn Alvarez DO    Medical Diagnosis: Pleurodynia [R07.81]       Treatment Diagnosis: rib pain, postural abnormality, postural muscle weakness    Visit Information:  Insurance: Payor: MED[employer] M[employer]UAL / Plan: MED[employer] M[employer]UAL [address] / Product Type: *No Product type* /   PT Visit Information  PT Insurance Information: Medical [place]  Total # of Visits Approved: 20  Total # of Visits to Date: 10  No Show: 0  Canceled Appointment: 0  Progress Note Counter: 2/3    Subjective Information:  Subjective: no changes since last visit  HEP Compliance:  [x] Good [] Fair [] Poor [] Reports not doing due to:    Pain Screening  Patient Currently in Pain: Yes  Pain Level: 2  Pain Location: Rib cage  Pain Orientation: Right, Left    Treatment:  Exercises:  Exercises  Exercise 3: wall pec stretch hand low 20 sec X 3 R/L  Exercise 5: Pball walk up wall x10  Exercise 8: TB rows/lats 2x15 RTB, cues to maintain chin tuck  Exercise 10: Seated UT/LS stretches 30\"x3 ea b/l  Exercise 11: Closed book and thoracic open book 5\"x10 ea (limited range) b/l  Exercise 12: UBE L1 4 min (retro)- slow movements demonstrated  Exercise 13: standing chest pull YTB 2-way 10- tactile cues for scapular retraction- unable to maintain UEs at 90 degs shoulder elevation  Exercise 14: thoracic ext with 1/2 roll seated in chair 10 sec X 5- hands at chest- limited ROM  Exercise 20: HEP: cont current  Treatment Reasoning  Limitations addressed: Strength, Mobility, Flexibility, Activity tolerance, Posture    Manual:   Manual Therapy  Soft Tissue Mobilizaton: STM B paraspinals, UT X 11 min  *Indicates exercise, modality, or manual techniques to be initiated when appropriate    Objective Measures:   Strength: [x] NT  [] MMT completed:    ROM: [x] NT  [] ROM

## 2024-06-18 SDOH — HEALTH STABILITY: PHYSICAL HEALTH: ON AVERAGE, HOW MANY MINUTES DO YOU ENGAGE IN EXERCISE AT THIS LEVEL?: 20 MIN

## 2024-06-18 SDOH — HEALTH STABILITY: PHYSICAL HEALTH: ON AVERAGE, HOW MANY DAYS PER WEEK DO YOU ENGAGE IN MODERATE TO STRENUOUS EXERCISE (LIKE A BRISK WALK)?: 6 DAYS

## 2024-06-19 ENCOUNTER — OFFICE VISIT (OUTPATIENT)
Dept: FAMILY MEDICINE CLINIC | Age: 59
End: 2024-06-19
Payer: COMMERCIAL

## 2024-06-19 VITALS
WEIGHT: 125 LBS | HEART RATE: 79 BPM | TEMPERATURE: 97.5 F | OXYGEN SATURATION: 97 % | DIASTOLIC BLOOD PRESSURE: 70 MMHG | HEIGHT: 68 IN | SYSTOLIC BLOOD PRESSURE: 114 MMHG | BODY MASS INDEX: 18.94 KG/M2

## 2024-06-19 DIAGNOSIS — E78.5 HYPERLIPIDEMIA, UNSPECIFIED HYPERLIPIDEMIA TYPE: ICD-10-CM

## 2024-06-19 DIAGNOSIS — N18.30 STAGE 3 CHRONIC KIDNEY DISEASE, UNSPECIFIED WHETHER STAGE 3A OR 3B CKD (HCC): ICD-10-CM

## 2024-06-19 DIAGNOSIS — R07.81 RIB PAIN: ICD-10-CM

## 2024-06-19 DIAGNOSIS — Z76.89 ENCOUNTER TO ESTABLISH CARE WITH NEW DOCTOR: Primary | ICD-10-CM

## 2024-06-19 PROCEDURE — 1036F TOBACCO NON-USER: CPT | Performed by: STUDENT IN AN ORGANIZED HEALTH CARE EDUCATION/TRAINING PROGRAM

## 2024-06-19 PROCEDURE — 3074F SYST BP LT 130 MM HG: CPT | Performed by: STUDENT IN AN ORGANIZED HEALTH CARE EDUCATION/TRAINING PROGRAM

## 2024-06-19 PROCEDURE — 3017F COLORECTAL CA SCREEN DOC REV: CPT | Performed by: STUDENT IN AN ORGANIZED HEALTH CARE EDUCATION/TRAINING PROGRAM

## 2024-06-19 PROCEDURE — G8420 CALC BMI NORM PARAMETERS: HCPCS | Performed by: STUDENT IN AN ORGANIZED HEALTH CARE EDUCATION/TRAINING PROGRAM

## 2024-06-19 PROCEDURE — 3078F DIAST BP <80 MM HG: CPT | Performed by: STUDENT IN AN ORGANIZED HEALTH CARE EDUCATION/TRAINING PROGRAM

## 2024-06-19 PROCEDURE — G8427 DOCREV CUR MEDS BY ELIG CLIN: HCPCS | Performed by: STUDENT IN AN ORGANIZED HEALTH CARE EDUCATION/TRAINING PROGRAM

## 2024-06-19 PROCEDURE — 99214 OFFICE O/P EST MOD 30 MIN: CPT | Performed by: STUDENT IN AN ORGANIZED HEALTH CARE EDUCATION/TRAINING PROGRAM

## 2024-06-19 NOTE — PROGRESS NOTES
Subjective  Rosette Hartman, 59 y.o. female presents today with:  Chief Complaint   Patient presents with    Establish Care     Was seeing Dr. Alvarez & prior to that was seeing Dr. Alpa Slade.     Chronic Kidney Disease     Labs done 5/20/24.     Pain     Has been going to PT for chest/rib pain pain that started in Dec. Feels pain is improving.        Patient with appointment to establish care with new physician.  Previously seeing Dr. Alvarez.    Patient with history of hyperlipidemia.  She is on atorvastatin 10 mg daily.  Tolerating well.  No side effects from medication.  Overall doing well.  No muscle aches or pains.    Patient also with stage III chronic kidney disease.  Has been stable.  Last had lab work in May.  We did review this.  Not currently following with nephrology.  Blood pressure is normal.  No reason for CKD found.  She states that creatinine has always been elevated.      Patient also with ongoing rib and chest pain that started in December.  She does feel like pain is improving.  She has been doing physical therapy which has been helping.  Has used NSAIDs as needed.  She states that it has been slowly but surely improving.  She did have workup done.  Nothing was found.  She has no other concerns at this time.      Review of Systems   Constitutional:  Negative for chills, fatigue, fever and unexpected weight change.   HENT:  Negative for congestion, rhinorrhea and sore throat.    Eyes:  Negative for discharge.   Respiratory:  Negative for cough, shortness of breath and wheezing.    Cardiovascular:  Positive for chest pain (Musculoskeletal, significantly improved). Negative for palpitations and leg swelling.   Gastrointestinal:  Negative for abdominal pain, diarrhea, nausea and vomiting.   Genitourinary:  Negative for difficulty urinating.   Musculoskeletal:  Negative for arthralgias and joint swelling.   Skin:  Negative for rash.   Neurological:  Negative for weakness, light-headedness,

## 2024-06-24 ENCOUNTER — HOSPITAL ENCOUNTER (OUTPATIENT)
Dept: PHYSICAL THERAPY | Age: 59
Setting detail: THERAPIES SERIES
Discharge: HOME | End: 2024-06-24
Payer: COMMERCIAL

## 2024-06-24 PROCEDURE — 97110 THERAPEUTIC EXERCISES: CPT

## 2024-06-24 PROCEDURE — 97140 MANUAL THERAPY 1/> REGIONS: CPT

## 2024-06-24 ASSESSMENT — ENCOUNTER SYMPTOMS
SORE THROAT: 0
WHEEZING: 0
ABDOMINAL PAIN: 0
COUGH: 0
RHINORRHEA: 0
DIARRHEA: 0
NAUSEA: 0
SHORTNESS OF BREATH: 0
VOMITING: 0
EYE DISCHARGE: 0

## 2024-06-24 ASSESSMENT — PAIN SCALES - GENERAL: PAINLEVEL_OUTOF10: 2

## 2024-06-24 ASSESSMENT — PAIN DESCRIPTION - ORIENTATION: ORIENTATION: RIGHT;LEFT

## 2024-06-24 ASSESSMENT — PAIN DESCRIPTION - LOCATION: LOCATION: RIB CAGE

## 2024-06-24 ASSESSMENT — PAIN DESCRIPTION - DESCRIPTORS: DESCRIPTORS: ACHING;SORE

## 2024-06-24 ASSESSMENT — PAIN DESCRIPTION - PAIN TYPE: TYPE: CHRONIC PAIN

## 2024-06-24 NOTE — PROGRESS NOTES
PHYSICAL THERAPY PLAN OF CARE   [facility]      [address], [address]   [place] [address]                           Ph: [phone] Fax: [phone]    [] Certification  [] Recertification []  Plan of Care  [] Progress Note [x] Discharge      Referring Provider: Joslyn Alvarez DO    From:  Adrienne Oliveros PT    Patient: Rosette Hartman (59 y.o. female) : 1965 Date: 2024   Medical Diagnosis: Pleurodynia [R07.81]    Treatment Diagnosis: rib pain, postural abnormality, postural muscle weakness    Progress Report Period from: 2024  to 2024    Visits to Date: 11 No Show: 0 Cancelled Appts: 0    OBJECTIVE:   Short Term Goals - Time Frame for Short Term Goals: 2 wks    Goals Current/Discharge status  Status   Short Term Goal 1: Independent with HEP to promote home management of symptoms  STG 1 Current Status:: : Pt reported good compliance with HEP   Met   Short Term Goal 2: Report 25% redcution in symptoms with </= 7/10 worst with activity  STG 2 Current Status::  75-80% reduction of symptoms since starting therapy.   Met       Long Term Goals - Time Frame for Long Term Goals : 4 wks  Goals Current/ Discharge status Status   Long Term Goal 1: Decrease pain by 50% </= 5/10 pain at worst with improved sleep LTG 1 Current Status:: : 2/10 pain while sleeping   Met   Long Term Goal 2: Improve postural awareness and strategies to prevent worsening of posture LTG 2 Current Status:: : Pt with improved postural awareness with no cues for correction.   Met   Long Term Goal 3: Improve UE strength >/= 4+/5 to improve ease with ADLs LTG 3 Current Status:: : Bilateral UE strength 5/5 except for R UE extension 4+/5   Met   Long Term Goal 4: Oswestry </= 8 to demonstrate improved overall activity tolerance LTG 4 Current Status:: : Oswestry = 7/50 (4 point improvement)   Met       Body Structures, Functions, Activity Limitations Requiring Skilled

## 2024-06-24 NOTE — PROGRESS NOTES
[facility]  Outpatient Physical Therapy    Treatment Note        Date: 2024  Patient: Rosette Hartman  : 1965   Confirmed: Yes  MRN: [id number]  Referring Provider: Joslyn Alvarez DO   Secondary Referring Provider (If applicable):     Medical Diagnosis: Pleurodynia [R07.81]    Treatment Diagnosis: rib pain, postural abnormality, postural muscle weakness    Visit Information:  Insurance: Payor: MEDICAL M[employer]UAL / Plan: MEDICAL MUTUAL [address] / Product Type: *No Product type* /   PT Visit Information  PT Insurance Information: Medical [place]  Total # of Visits Approved: 20  Total # of Visits to Date: 11  No Show: 0  Canceled Appointment: 0  Progress Note Counter: 3/3    Subjective Information:  Subjective: Pt reported she is doing well with occasional pain, however nothing like it use to be.  HEP Compliance:  [x] Good [] Fair [] Poor [] Reports not doing due to:    Pain Screening  Patient Currently in Pain: Yes  Pain Assessment: 0-10  Pain Level: 2  Pain Type: Chronic pain  Pain Location: Rib cage  Pain Orientation: Right, Left  Pain Descriptors: Aching, Sore    Treatment:  Exercises:  Exercises  Exercise 3: wall pec stretch hand low 20 sec X 3 R/L  Exercise 5: Pball walk up wall x10  Exercise 6: Supine chest pulls w/ YTB x10 limited range  Exercise 7: chin tuck w/ ball at wall x10  Exercise 8: TB rows/lats 2x15 RTB, cues to maintain chin tuck  Exercise 9: MMT/ objective measures  Exercise 10: Seated UT/LS stretches 30\"x3 ea b/l  Exercise 11: Closed book and thoracic open book 5\"x10 ea (limited range) b/l  Exercise 12: UBE L1 4 min (retro)- slow movements demonstrated  Exercise 13: standing chest pull YTB 2-way 10- tactile cues for scapular retraction- unable to maintain UEs at 90 degs shoulder elevation  Exercise 14: thoracic ext with 1/2 roll seated in chair 10 sec X 5- hands at chest- limited ROM  Exercise 20: HEP: cont current  Treatment Reasoning  Limitations

## 2024-11-04 ENCOUNTER — IMMUNIZATION (OUTPATIENT)
Dept: FAMILY MEDICINE CLINIC | Age: 59
End: 2024-11-04
Payer: COMMERCIAL

## 2024-11-04 DIAGNOSIS — Z23 NEED FOR INFLUENZA VACCINATION: Primary | ICD-10-CM

## 2024-11-04 PROCEDURE — 90471 IMMUNIZATION ADMIN: CPT | Performed by: STUDENT IN AN ORGANIZED HEALTH CARE EDUCATION/TRAINING PROGRAM

## 2024-11-04 PROCEDURE — 90661 CCIIV3 VAC ABX FR 0.5 ML IM: CPT | Performed by: STUDENT IN AN ORGANIZED HEALTH CARE EDUCATION/TRAINING PROGRAM

## 2024-12-11 DIAGNOSIS — N18.30 STAGE 3 CHRONIC KIDNEY DISEASE, UNSPECIFIED WHETHER STAGE 3A OR 3B CKD (HCC): ICD-10-CM

## 2024-12-11 LAB
ALBUMIN SERPL-MCNC: 4.5 G/DL (ref 3.5–4.6)
ALP SERPL-CCNC: 89 U/L (ref 40–130)
ALT SERPL-CCNC: 19 U/L (ref 0–33)
ANION GAP SERPL CALCULATED.3IONS-SCNC: 11 MEQ/L (ref 9–15)
AST SERPL-CCNC: 31 U/L (ref 0–35)
BASOPHILS # BLD: 0.1 K/UL (ref 0–0.2)
BASOPHILS NFR BLD: 1.3 %
BILIRUB SERPL-MCNC: 0.6 MG/DL (ref 0.2–0.7)
BUN SERPL-MCNC: 24 MG/DL (ref 8–23)
CALCIUM SERPL-MCNC: 9.9 MG/DL (ref 8.5–9.9)
CHLORIDE SERPL-SCNC: 102 MEQ/L (ref 95–107)
CO2 SERPL-SCNC: 27 MEQ/L (ref 20–31)
CREAT SERPL-MCNC: 1.06 MG/DL (ref 0.5–0.9)
EOSINOPHIL # BLD: 0.1 K/UL (ref 0–0.7)
EOSINOPHIL NFR BLD: 3.3 %
ERYTHROCYTE [DISTWIDTH] IN BLOOD BY AUTOMATED COUNT: 12.7 % (ref 11.5–14.5)
GLOBULIN SER CALC-MCNC: 2.4 G/DL (ref 2.3–3.5)
GLUCOSE FASTING: 88 MG/DL (ref 70–99)
HCT VFR BLD AUTO: 38.5 % (ref 37–47)
HGB BLD-MCNC: 12.4 G/DL (ref 12–16)
LYMPHOCYTES # BLD: 1.1 K/UL (ref 1–4.8)
LYMPHOCYTES NFR BLD: 27.6 %
MCH RBC QN AUTO: 30.5 PG (ref 27–31.3)
MCHC RBC AUTO-ENTMCNC: 32.2 % (ref 33–37)
MCV RBC AUTO: 94.8 FL (ref 79.4–94.8)
MONOCYTES # BLD: 0.5 K/UL (ref 0.2–0.8)
MONOCYTES NFR BLD: 12.3 %
NEUTROPHILS # BLD: 2.2 K/UL (ref 1.4–6.5)
NEUTS SEG NFR BLD: 55.5 %
PLATELET # BLD AUTO: 208 K/UL (ref 130–400)
POTASSIUM SERPL-SCNC: 4.2 MEQ/L (ref 3.4–4.9)
PROT SERPL-MCNC: 6.9 G/DL (ref 6.3–8)
RBC # BLD AUTO: 4.06 M/UL (ref 4.2–5.4)
SODIUM SERPL-SCNC: 140 MEQ/L (ref 135–144)
WBC # BLD AUTO: 3.9 K/UL (ref 4.8–10.8)

## 2024-12-11 NOTE — RESULT ENCOUNTER NOTE
Please inform patient that lab work is stable.  Will review in detail at follow-up appointment on 23rd

## 2024-12-23 ENCOUNTER — OFFICE VISIT (OUTPATIENT)
Age: 59
End: 2024-12-23
Payer: COMMERCIAL

## 2024-12-23 VITALS
HEIGHT: 68 IN | BODY MASS INDEX: 19.7 KG/M2 | DIASTOLIC BLOOD PRESSURE: 70 MMHG | WEIGHT: 130 LBS | SYSTOLIC BLOOD PRESSURE: 110 MMHG | HEART RATE: 61 BPM | OXYGEN SATURATION: 99 % | TEMPERATURE: 97.2 F

## 2024-12-23 DIAGNOSIS — E78.5 HYPERLIPIDEMIA, UNSPECIFIED HYPERLIPIDEMIA TYPE: Primary | ICD-10-CM

## 2024-12-23 DIAGNOSIS — Z12.31 SCREENING MAMMOGRAM, ENCOUNTER FOR: ICD-10-CM

## 2024-12-23 DIAGNOSIS — N18.30 STAGE 3 CHRONIC KIDNEY DISEASE, UNSPECIFIED WHETHER STAGE 3A OR 3B CKD (HCC): ICD-10-CM

## 2024-12-23 PROCEDURE — 3074F SYST BP LT 130 MM HG: CPT | Performed by: STUDENT IN AN ORGANIZED HEALTH CARE EDUCATION/TRAINING PROGRAM

## 2024-12-23 PROCEDURE — G8427 DOCREV CUR MEDS BY ELIG CLIN: HCPCS | Performed by: STUDENT IN AN ORGANIZED HEALTH CARE EDUCATION/TRAINING PROGRAM

## 2024-12-23 PROCEDURE — G8420 CALC BMI NORM PARAMETERS: HCPCS | Performed by: STUDENT IN AN ORGANIZED HEALTH CARE EDUCATION/TRAINING PROGRAM

## 2024-12-23 PROCEDURE — 3078F DIAST BP <80 MM HG: CPT | Performed by: STUDENT IN AN ORGANIZED HEALTH CARE EDUCATION/TRAINING PROGRAM

## 2024-12-23 PROCEDURE — G8484 FLU IMMUNIZE NO ADMIN: HCPCS | Performed by: STUDENT IN AN ORGANIZED HEALTH CARE EDUCATION/TRAINING PROGRAM

## 2024-12-23 PROCEDURE — 3017F COLORECTAL CA SCREEN DOC REV: CPT | Performed by: STUDENT IN AN ORGANIZED HEALTH CARE EDUCATION/TRAINING PROGRAM

## 2024-12-23 PROCEDURE — 1036F TOBACCO NON-USER: CPT | Performed by: STUDENT IN AN ORGANIZED HEALTH CARE EDUCATION/TRAINING PROGRAM

## 2024-12-23 PROCEDURE — 99214 OFFICE O/P EST MOD 30 MIN: CPT | Performed by: STUDENT IN AN ORGANIZED HEALTH CARE EDUCATION/TRAINING PROGRAM

## 2024-12-23 RX ORDER — ACETAMINOPHEN 160 MG
2000 TABLET,DISINTEGRATING ORAL EVERY OTHER DAY
COMMUNITY

## 2024-12-23 RX ORDER — ATORVASTATIN CALCIUM 10 MG/1
10 TABLET, FILM COATED ORAL DAILY
Qty: 90 TABLET | Refills: 3 | Status: SHIPPED | OUTPATIENT
Start: 2024-12-23

## 2024-12-23 NOTE — PROGRESS NOTES
Subjective  Rosette Hartman, 59 y.o. female presents today with:  Chief Complaint   Patient presents with    6 Month Follow-Up    Chronic Kidney Disease     Labs done 12/11/24.     Hyperlipidemia     Lipid panel done 5/20/24. Taking medication as directed, no side effects.     Results     Would like to review recent test results.        History of Present Illness  The patient is a 59-year-old female who presents for follow-up.    She reports overall good health. She has not observed her cholesterol levels in the report. Her sodium levels were within the normal range, but her calcium levels were slightly elevated. She does not consume dairy milk and supplements her diet with vitamin D every other day. She is currently on atorvastatin 10 mg and has approximately one week's supply remaining. She has expressed interest in home testing for colorectal cancer screening.  This will be due early next year.    She experienced rib pain last winter, which she attributes to illness and stress. She was referred to physical therapy by her primary care physician, which resulted in significant improvement. She believes that lack of stretching may have contributed to her symptoms.  She has no other concerns at this time.  She will be due for mammogram.  This will be ordered.        Past Medical History:   Diagnosis Date    Abdominal pain     Chronic kidney disease     Hypertension     Kidney stone on left side 02/2017    Osteoarthritis     Postmenopausal      History reviewed. No pertinent surgical history.  Current Outpatient Medications   Medication Sig Dispense Refill    vitamin D (VITAMIN D3) 50 MCG (2000 UT) CAPS capsule Take 1 capsule by mouth every other day      atorvastatin (LIPITOR) 10 MG tablet Take 1 tablet by mouth daily 90 tablet 3    Ascorbic Acid (VITAMIN C) 250 MG CHEW       CALCIUM PO Calcium Active      Cyanocobalamin (VITAMIN B-12 ER PO) Vitamin B12 Active      Pyridoxine HCl (VITAMIN B6) 100 MG TABS Take by

## 2025-01-17 ENCOUNTER — HOSPITAL ENCOUNTER (OUTPATIENT)
Dept: WOMENS IMAGING | Age: 60
Discharge: HOME | End: 2025-01-19
Attending: STUDENT IN AN ORGANIZED HEALTH CARE EDUCATION/TRAINING PROGRAM
Payer: COMMERCIAL

## 2025-01-17 DIAGNOSIS — Z12.31 SCREENING MAMMOGRAM, ENCOUNTER FOR: ICD-10-CM

## 2025-01-17 PROCEDURE — 77063 BREAST TOMOSYNTHESIS BI: CPT

## 2025-04-08 ENCOUNTER — OFFICE VISIT (OUTPATIENT)
Age: 60
End: 2025-04-08
Payer: COMMERCIAL

## 2025-04-08 VITALS
DIASTOLIC BLOOD PRESSURE: 80 MMHG | HEART RATE: 75 BPM | TEMPERATURE: 97.3 F | RESPIRATION RATE: 18 BRPM | BODY MASS INDEX: 19.7 KG/M2 | SYSTOLIC BLOOD PRESSURE: 114 MMHG | HEIGHT: 68 IN | WEIGHT: 130 LBS | OXYGEN SATURATION: 99 %

## 2025-04-08 DIAGNOSIS — J02.9 SORE THROAT: ICD-10-CM

## 2025-04-08 DIAGNOSIS — J34.89 RHINORRHEA: ICD-10-CM

## 2025-04-08 DIAGNOSIS — R30.0 DYSURIA: Primary | ICD-10-CM

## 2025-04-08 LAB
BILIRUBIN, POC: NORMAL
BLOOD URINE, POC: NORMAL
CLARITY, POC: CLEAR
COLOR, POC: YELLOW
GLUCOSE URINE, POC: NORMAL MG/DL
KETONES, POC: NORMAL MG/DL
LEUKOCYTE EST, POC: NORMAL
NITRITE, POC: NORMAL
PH, POC: 5.5
PROTEIN, POC: NORMAL MG/DL
SPECIFIC GRAVITY, POC: 1.02
STREP PYOGENES DNA, POC: NEGATIVE
UROBILINOGEN, POC: 0.2 MG/DL
VALID INTERNAL CONTROL, POC: NORMAL

## 2025-04-08 PROCEDURE — 3079F DIAST BP 80-89 MM HG: CPT | Performed by: STUDENT IN AN ORGANIZED HEALTH CARE EDUCATION/TRAINING PROGRAM

## 2025-04-08 PROCEDURE — 81003 URINALYSIS AUTO W/O SCOPE: CPT | Performed by: STUDENT IN AN ORGANIZED HEALTH CARE EDUCATION/TRAINING PROGRAM

## 2025-04-08 PROCEDURE — 87651 STREP A DNA AMP PROBE: CPT | Performed by: STUDENT IN AN ORGANIZED HEALTH CARE EDUCATION/TRAINING PROGRAM

## 2025-04-08 PROCEDURE — 3074F SYST BP LT 130 MM HG: CPT | Performed by: STUDENT IN AN ORGANIZED HEALTH CARE EDUCATION/TRAINING PROGRAM

## 2025-04-08 PROCEDURE — 99213 OFFICE O/P EST LOW 20 MIN: CPT | Performed by: STUDENT IN AN ORGANIZED HEALTH CARE EDUCATION/TRAINING PROGRAM

## 2025-04-08 RX ORDER — SULFAMETHOXAZOLE AND TRIMETHOPRIM 800; 160 MG/1; MG/1
1 TABLET ORAL 2 TIMES DAILY
Qty: 6 TABLET | Refills: 0 | Status: SHIPPED | OUTPATIENT
Start: 2025-04-08 | End: 2025-04-11

## 2025-04-08 SDOH — ECONOMIC STABILITY: FOOD INSECURITY: WITHIN THE PAST 12 MONTHS, YOU WORRIED THAT YOUR FOOD WOULD RUN OUT BEFORE YOU GOT MONEY TO BUY MORE.: NEVER TRUE

## 2025-04-08 SDOH — ECONOMIC STABILITY: FOOD INSECURITY: WITHIN THE PAST 12 MONTHS, THE FOOD YOU BOUGHT JUST DIDN'T LAST AND YOU DIDN'T HAVE MONEY TO GET MORE.: NEVER TRUE

## 2025-04-08 ASSESSMENT — PATIENT HEALTH QUESTIONNAIRE - PHQ9
SUM OF ALL RESPONSES TO PHQ QUESTIONS 1-9: 0
SUM OF ALL RESPONSES TO PHQ QUESTIONS 1-9: 0
1. LITTLE INTEREST OR PLEASURE IN DOING THINGS: NOT AT ALL
2. FEELING DOWN, DEPRESSED OR HOPELESS: NOT AT ALL
SUM OF ALL RESPONSES TO PHQ QUESTIONS 1-9: 0
SUM OF ALL RESPONSES TO PHQ QUESTIONS 1-9: 0

## 2025-04-08 ASSESSMENT — ENCOUNTER SYMPTOMS: SORE THROAT: 1

## 2025-04-08 NOTE — PROGRESS NOTES
Subjective  Rosette Hartman, 60 y.o. female presents today with:  Chief Complaint   Patient presents with    Pharyngitis     Patient present today with sore throat for the past 2 days. She tried salt and water goggling with some relief. She has a history with strep.    Urinary Tract Infection     Patient present today with possible UTI with pain and irritation for the past few days.      She has dysuria for the past few days, no hematuria.  Also reporting a sore throat for the past 2 days and has a history of strep throat. Denies flank pain.     Review of Systems   HENT:  Positive for sore throat.    Genitourinary:  Positive for dysuria. Negative for hematuria.       Past Medical History:   Diagnosis Date    Abdominal pain     Chronic kidney disease     Hypertension     Kidney stone on left side 02/2017    Osteoarthritis     Postmenopausal      No past surgical history on file.  Current Outpatient Medications   Medication Sig Dispense Refill    sulfamethoxazole-trimethoprim (BACTRIM DS;SEPTRA DS) 800-160 MG per tablet Take 1 tablet by mouth 2 times daily for 3 days 6 tablet 0    vitamin D (VITAMIN D3) 50 MCG (2000 UT) CAPS capsule Take 1 capsule by mouth every other day      atorvastatin (LIPITOR) 10 MG tablet Take 1 tablet by mouth daily 90 tablet 3    Ascorbic Acid (VITAMIN C) 250 MG CHEW       CALCIUM PO Calcium Active      Cyanocobalamin (VITAMIN B-12 ER PO) Vitamin B12 Active      Pyridoxine HCl (VITAMIN B6) 100 MG TABS Take by mouth      Zinc Sulfate (ZINC 15 PO) Take by mouth       No current facility-administered medications for this visit.     Allergies, PMH, Surgical Hx, Family Hx, and Social Hx reviewed and updated.    Objective    Vitals:    04/08/25 0916   BP: 114/80   BP Site: Right Upper Arm   Patient Position: Sitting   BP Cuff Size: Medium Adult   Pulse: 75   Resp: 18   Temp: 97.3 °F (36.3 °C)   TempSrc: Temporal   SpO2: 99%   Weight: 59 kg (130 lb)   Height: 1.727 m (5' 7.99\")       Physical

## 2025-04-09 LAB — BACTERIA UR CULT: NORMAL

## 2025-04-14 DIAGNOSIS — E78.5 HYPERLIPIDEMIA, UNSPECIFIED HYPERLIPIDEMIA TYPE: ICD-10-CM

## 2025-04-14 RX ORDER — ATORVASTATIN CALCIUM 10 MG/1
10 TABLET, FILM COATED ORAL DAILY
Qty: 90 TABLET | Refills: 3 | Status: SHIPPED | OUTPATIENT
Start: 2025-04-14

## 2025-05-08 DIAGNOSIS — N18.30 STAGE 3 CHRONIC KIDNEY DISEASE, UNSPECIFIED WHETHER STAGE 3A OR 3B CKD (HCC): ICD-10-CM

## 2025-05-08 DIAGNOSIS — E78.5 HYPERLIPIDEMIA, UNSPECIFIED HYPERLIPIDEMIA TYPE: ICD-10-CM

## 2025-05-08 LAB
ALBUMIN SERPL-MCNC: 4.6 G/DL (ref 3.5–4.6)
ALP SERPL-CCNC: 90 U/L (ref 40–130)
ALT SERPL-CCNC: 18 U/L (ref 0–33)
ANION GAP SERPL CALCULATED.3IONS-SCNC: 14 MEQ/L (ref 9–15)
AST SERPL-CCNC: 30 U/L (ref 0–35)
BASOPHILS # BLD: 0 K/UL (ref 0–0.2)
BASOPHILS NFR BLD: 1 %
BILIRUB SERPL-MCNC: 0.7 MG/DL (ref 0.2–0.7)
BUN SERPL-MCNC: 26 MG/DL (ref 8–23)
CALCIUM SERPL-MCNC: 10 MG/DL (ref 8.5–9.9)
CHLORIDE SERPL-SCNC: 102 MEQ/L (ref 95–107)
CHOLEST SERPL-MCNC: 175 MG/DL (ref 0–199)
CO2 SERPL-SCNC: 25 MEQ/L (ref 20–31)
CREAT SERPL-MCNC: 1.15 MG/DL (ref 0.5–0.9)
EOSINOPHIL # BLD: 0.1 K/UL (ref 0–0.7)
EOSINOPHIL NFR BLD: 2.8 %
ERYTHROCYTE [DISTWIDTH] IN BLOOD BY AUTOMATED COUNT: 12.8 % (ref 11.5–14.5)
GLOBULIN SER CALC-MCNC: 2.8 G/DL (ref 2.3–3.5)
GLUCOSE FASTING: 91 MG/DL (ref 70–99)
HCT VFR BLD AUTO: 39.9 % (ref 37–47)
HDLC SERPL-MCNC: 79 MG/DL (ref 40–59)
HGB BLD-MCNC: 13.3 G/DL (ref 12–16)
LDL CHOLESTEROL: 85 MG/DL (ref 0–129)
LYMPHOCYTES # BLD: 1.2 K/UL (ref 1–4.8)
LYMPHOCYTES NFR BLD: 29.6 %
MCH RBC QN AUTO: 31.1 PG (ref 27–31.3)
MCHC RBC AUTO-ENTMCNC: 33.3 % (ref 33–37)
MCV RBC AUTO: 93.4 FL (ref 79.4–94.8)
MONOCYTES # BLD: 0.4 K/UL (ref 0.2–0.8)
MONOCYTES NFR BLD: 10.1 %
NEUTROPHILS # BLD: 2.2 K/UL (ref 1.4–6.5)
NEUTS SEG NFR BLD: 56.2 %
PLATELET # BLD AUTO: 216 K/UL (ref 130–400)
POTASSIUM SERPL-SCNC: 4.4 MEQ/L (ref 3.4–4.9)
PROT SERPL-MCNC: 7.4 G/DL (ref 6.3–8)
RBC # BLD AUTO: 4.27 M/UL (ref 4.2–5.4)
SODIUM SERPL-SCNC: 141 MEQ/L (ref 135–144)
TRIGLYCERIDE, FASTING: 55 MG/DL (ref 0–150)
WBC # BLD AUTO: 3.9 K/UL (ref 4.8–10.8)

## 2025-05-09 ENCOUNTER — RESULTS FOLLOW-UP (OUTPATIENT)
Age: 60
End: 2025-05-09

## 2025-05-19 ENCOUNTER — OFFICE VISIT (OUTPATIENT)
Age: 60
End: 2025-05-19
Payer: COMMERCIAL

## 2025-05-19 VITALS
OXYGEN SATURATION: 100 % | SYSTOLIC BLOOD PRESSURE: 134 MMHG | BODY MASS INDEX: 19.55 KG/M2 | WEIGHT: 129 LBS | HEART RATE: 74 BPM | TEMPERATURE: 97.6 F | DIASTOLIC BLOOD PRESSURE: 80 MMHG | HEIGHT: 68 IN

## 2025-05-19 DIAGNOSIS — E78.5 HYPERLIPIDEMIA, UNSPECIFIED HYPERLIPIDEMIA TYPE: ICD-10-CM

## 2025-05-19 DIAGNOSIS — J30.2 SEASONAL ALLERGIES: ICD-10-CM

## 2025-05-19 DIAGNOSIS — N18.30 STAGE 3 CHRONIC KIDNEY DISEASE, UNSPECIFIED WHETHER STAGE 3A OR 3B CKD (HCC): ICD-10-CM

## 2025-05-19 DIAGNOSIS — I10 PRIMARY HYPERTENSION: Primary | ICD-10-CM

## 2025-05-19 DIAGNOSIS — G25.0 ESSENTIAL TREMOR: ICD-10-CM

## 2025-05-19 PROCEDURE — 3079F DIAST BP 80-89 MM HG: CPT | Performed by: STUDENT IN AN ORGANIZED HEALTH CARE EDUCATION/TRAINING PROGRAM

## 2025-05-19 PROCEDURE — 3075F SYST BP GE 130 - 139MM HG: CPT | Performed by: STUDENT IN AN ORGANIZED HEALTH CARE EDUCATION/TRAINING PROGRAM

## 2025-05-19 PROCEDURE — 99214 OFFICE O/P EST MOD 30 MIN: CPT | Performed by: STUDENT IN AN ORGANIZED HEALTH CARE EDUCATION/TRAINING PROGRAM

## 2025-05-19 NOTE — PROGRESS NOTES
Subjective  Rosette Hartman, 60 y.o. female presents today with:  Chief Complaint   Patient presents with    Follow-up    Chronic Kidney Disease     Labs done 5/8/25.    Hyperlipidemia     Lipid panel done 5/8/25.    Results     Would like to review recent test results.     Allergies     States she has never really had allergies before, but has been having symptoms of allergies recently, one being a dry cough. States she did see Dr. Mitchell in April about this, but would like to discuss.     Tremors     Has had a tremor in her left hand for several years, seems to be getting worse.        History of Present Illness  The patient presents for a follow-up visit.    She has been experiencing a tremor in her left hand for several years, which is particularly noticeable when holding papers or drinking, requiring conscious effort to control. She is left-handed and does not consume alcohol. She prefers to monitor the condition rather than start medication at this time.    She reports an increase in allergy symptoms over the past few months, including itchy eyes and a dry cough. She has been using cough drops at night for relief. She attempted to use Flonase nasal spray but discontinued it due to increased drainage and dripping. She occasionally uses a saline solution, which seems to provide some relief.    She also reports occasional lightheadedness, particularly in the morning.    A few months ago, she suspected a urinary tract infection (UTI) due to irritation but currently does not experience any urinary symptoms. She has a history of kidney stones and is uncertain if the current irritation is related to vaginal dryness.    She continues her regimen of atorvastatin 10 mg without any complications. No other concerns at this time     SOCIAL HISTORY  She does not drink alcohol.      Past Medical History:   Diagnosis Date    Abdominal pain     Chronic kidney disease     Hypertension     Kidney stone on left side 02/2017

## 2025-07-28 ENCOUNTER — OFFICE VISIT (OUTPATIENT)
Age: 60
End: 2025-07-28
Payer: COMMERCIAL

## 2025-07-28 VITALS
SYSTOLIC BLOOD PRESSURE: 108 MMHG | HEIGHT: 68 IN | TEMPERATURE: 97.3 F | WEIGHT: 135 LBS | DIASTOLIC BLOOD PRESSURE: 78 MMHG | BODY MASS INDEX: 20.46 KG/M2 | OXYGEN SATURATION: 98 % | HEART RATE: 89 BPM

## 2025-07-28 DIAGNOSIS — I10 PRIMARY HYPERTENSION: Primary | ICD-10-CM

## 2025-07-28 DIAGNOSIS — Z12.11 SCREENING FOR COLON CANCER: ICD-10-CM

## 2025-07-28 DIAGNOSIS — R42 DIZZINESS: ICD-10-CM

## 2025-07-28 PROCEDURE — 99213 OFFICE O/P EST LOW 20 MIN: CPT | Performed by: STUDENT IN AN ORGANIZED HEALTH CARE EDUCATION/TRAINING PROGRAM

## 2025-07-28 PROCEDURE — 3078F DIAST BP <80 MM HG: CPT | Performed by: STUDENT IN AN ORGANIZED HEALTH CARE EDUCATION/TRAINING PROGRAM

## 2025-07-28 PROCEDURE — 3074F SYST BP LT 130 MM HG: CPT | Performed by: STUDENT IN AN ORGANIZED HEALTH CARE EDUCATION/TRAINING PROGRAM

## 2025-07-28 NOTE — PROGRESS NOTES
Subjective  Rosette Hartman, 60 y.o. female presents today with:  Chief Complaint   Patient presents with    Hypertension     Has been having elevated BP since Friday. States BP is highest in the mornings. Does have log of readings with her today. Denies chest pains, heart palpitations, headaches, SOB or edema. Has been having dizziness & overall just not feeling well.        History of Present Illness  The patient is a 61-year-old female who presents for an acute visit.    She experiences dizziness upon waking up in the morning, which typically subsides by midday or evening. This pattern has been consistent over the years. The dizziness lasts for a few hours and is accompanied by a feeling of unease. She has considered the possibility of low blood sugar levels as a cause of her symptoms. Her last meal is usually around 6 PM, and she wakes up at 6 AM, eating breakfast around 7 AM. She continues to feel dizzy even after breakfast, although the symptoms sometimes improve slightly. She maintains a healthy diet and active lifestyle, including walking 1.5 miles each morning during the summer. However, she has noticed a recent worsening of her symptoms. She believes her weight is approximately 5 pounds above her ideal weight. She limits her water intake after 7 PM and goes to bed around 10 PM. She is not taking any unusual supplements. She takes zinc and vitamin D every other day.    She was previously on Benicar for blood pressure management but was taken off it due to excessively low blood pressure readings. She makes an effort to stay hydrated and monitor her sodium intake.  No other concerns at this time.    Social History:  Hobbies: Walking  Diet: Healthy diet, monitors sodium intake  Sleep: Goes to bed around 10 PM, wakes up at 6 AM      Past Medical History:   Diagnosis Date    Abdominal pain     Chronic kidney disease     Hypertension     Kidney stone on left side 02/2017    Osteoarthritis     Postmenopausal

## 2025-07-29 DIAGNOSIS — I10 PRIMARY HYPERTENSION: ICD-10-CM

## 2025-07-29 LAB
ANION GAP SERPL CALCULATED.3IONS-SCNC: 14 MEQ/L (ref 9–15)
BASOPHILS # BLD: 0 K/UL (ref 0–0.2)
BASOPHILS NFR BLD: 0.8 %
BUN SERPL-MCNC: 28 MG/DL (ref 8–23)
CALCIUM SERPL-MCNC: 9.5 MG/DL (ref 8.5–9.9)
CHLORIDE SERPL-SCNC: 102 MEQ/L (ref 95–107)
CO2 SERPL-SCNC: 23 MEQ/L (ref 20–31)
CREAT SERPL-MCNC: 1.11 MG/DL (ref 0.5–0.9)
EOSINOPHIL # BLD: 0.1 K/UL (ref 0–0.7)
EOSINOPHIL NFR BLD: 1.7 %
ERYTHROCYTE [DISTWIDTH] IN BLOOD BY AUTOMATED COUNT: 12.8 % (ref 11.5–14.5)
GLUCOSE SERPL-MCNC: 88 MG/DL (ref 70–99)
HCT VFR BLD AUTO: 36.7 % (ref 37–47)
HGB BLD-MCNC: 12.2 G/DL (ref 12–16)
LYMPHOCYTES # BLD: 1 K/UL (ref 1–4.8)
LYMPHOCYTES NFR BLD: 21.3 %
MCH RBC QN AUTO: 32 PG (ref 27–31.3)
MCHC RBC AUTO-ENTMCNC: 33.2 % (ref 33–37)
MCV RBC AUTO: 96.3 FL (ref 79.4–94.8)
MONOCYTES # BLD: 0.5 K/UL (ref 0.2–0.8)
MONOCYTES NFR BLD: 10.1 %
NEUTROPHILS # BLD: 3.1 K/UL (ref 1.4–6.5)
NEUTS SEG NFR BLD: 65.9 %
PLATELET # BLD AUTO: 202 K/UL (ref 130–400)
POTASSIUM SERPL-SCNC: 4.1 MEQ/L (ref 3.4–4.9)
RBC # BLD AUTO: 3.81 M/UL (ref 4.2–5.4)
SODIUM SERPL-SCNC: 139 MEQ/L (ref 135–144)
WBC # BLD AUTO: 4.8 K/UL (ref 4.8–10.8)

## 2025-08-05 ENCOUNTER — OFFICE VISIT (OUTPATIENT)
Age: 60
End: 2025-08-05
Payer: COMMERCIAL

## 2025-08-05 VITALS
WEIGHT: 135 LBS | BODY MASS INDEX: 20.46 KG/M2 | SYSTOLIC BLOOD PRESSURE: 126 MMHG | HEIGHT: 68 IN | TEMPERATURE: 97.7 F | HEART RATE: 77 BPM | DIASTOLIC BLOOD PRESSURE: 80 MMHG | OXYGEN SATURATION: 98 %

## 2025-08-05 DIAGNOSIS — R42 LIGHTHEADED: ICD-10-CM

## 2025-08-05 DIAGNOSIS — R06.02 SHORTNESS OF BREATH: ICD-10-CM

## 2025-08-05 DIAGNOSIS — R42 DIZZINESS: Primary | ICD-10-CM

## 2025-08-05 LAB
CHP ED QC CHECK: NORMAL
GLUCOSE BLD-MCNC: 167 MG/DL

## 2025-08-05 PROCEDURE — 93000 ELECTROCARDIOGRAM COMPLETE: CPT | Performed by: NURSE PRACTITIONER

## 2025-08-05 PROCEDURE — 82962 GLUCOSE BLOOD TEST: CPT | Performed by: NURSE PRACTITIONER

## 2025-08-05 PROCEDURE — 3074F SYST BP LT 130 MM HG: CPT | Performed by: NURSE PRACTITIONER

## 2025-08-05 PROCEDURE — 99213 OFFICE O/P EST LOW 20 MIN: CPT | Performed by: NURSE PRACTITIONER

## 2025-08-05 PROCEDURE — 3078F DIAST BP <80 MM HG: CPT | Performed by: NURSE PRACTITIONER

## 2025-08-05 ASSESSMENT — ENCOUNTER SYMPTOMS
CHEST TIGHTNESS: 0
WHEEZING: 0
VOMITING: 0
DIARRHEA: 0
STRIDOR: 0
NAUSEA: 0
SHORTNESS OF BREATH: 1
COUGH: 0
ABDOMINAL PAIN: 0
CHOKING: 0

## 2025-08-07 LAB — NONINV COLON CA DNA+OCC BLD SCRN STL QL: NEGATIVE

## 2025-08-11 ENCOUNTER — OFFICE VISIT (OUTPATIENT)
Age: 60
End: 2025-08-11
Payer: COMMERCIAL

## 2025-08-11 VITALS
HEIGHT: 68 IN | WEIGHT: 134 LBS | OXYGEN SATURATION: 98 % | HEART RATE: 60 BPM | BODY MASS INDEX: 20.31 KG/M2 | SYSTOLIC BLOOD PRESSURE: 132 MMHG | TEMPERATURE: 97.7 F | DIASTOLIC BLOOD PRESSURE: 80 MMHG

## 2025-08-11 DIAGNOSIS — R55 POSTURAL DIZZINESS WITH PRESYNCOPE: ICD-10-CM

## 2025-08-11 DIAGNOSIS — I10 PRIMARY HYPERTENSION: ICD-10-CM

## 2025-08-11 DIAGNOSIS — F41.9 ANXIETY: ICD-10-CM

## 2025-08-11 DIAGNOSIS — R42 LIGHTHEADEDNESS: ICD-10-CM

## 2025-08-11 DIAGNOSIS — R42 LIGHTHEADEDNESS: Primary | ICD-10-CM

## 2025-08-11 DIAGNOSIS — R42 POSTURAL DIZZINESS WITH PRESYNCOPE: ICD-10-CM

## 2025-08-11 LAB
ALBUMIN SERPL-MCNC: 5 G/DL (ref 3.5–4.6)
ALP SERPL-CCNC: 91 U/L (ref 40–130)
ALT SERPL-CCNC: 19 U/L (ref 0–33)
ANION GAP SERPL CALCULATED.3IONS-SCNC: 13 MEQ/L (ref 9–15)
AST SERPL-CCNC: 25 U/L (ref 0–35)
BASOPHILS # BLD: 0.1 K/UL (ref 0–0.2)
BASOPHILS NFR BLD: 0.9 %
BILIRUB SERPL-MCNC: 0.5 MG/DL (ref 0.2–0.7)
BUN SERPL-MCNC: 30 MG/DL (ref 8–23)
CALCIUM SERPL-MCNC: 9.9 MG/DL (ref 8.5–9.9)
CHLORIDE SERPL-SCNC: 102 MEQ/L (ref 95–107)
CO2 SERPL-SCNC: 23 MEQ/L (ref 20–31)
CREAT SERPL-MCNC: 0.84 MG/DL (ref 0.5–0.9)
EOSINOPHIL # BLD: 0.1 K/UL (ref 0–0.7)
EOSINOPHIL NFR BLD: 0.8 %
ERYTHROCYTE [DISTWIDTH] IN BLOOD BY AUTOMATED COUNT: 12.5 % (ref 11.5–14.5)
GLOBULIN SER CALC-MCNC: 2.5 G/DL (ref 2.3–3.5)
GLUCOSE SERPL-MCNC: 118 MG/DL (ref 70–99)
HCT VFR BLD AUTO: 39.7 % (ref 37–47)
HGB BLD-MCNC: 13.1 G/DL (ref 12–16)
LYMPHOCYTES # BLD: 1.4 K/UL (ref 1–4.8)
LYMPHOCYTES NFR BLD: 18.5 %
MCH RBC QN AUTO: 31 PG (ref 27–31.3)
MCHC RBC AUTO-ENTMCNC: 33 % (ref 33–37)
MCV RBC AUTO: 94.1 FL (ref 79.4–94.8)
MONOCYTES # BLD: 0.7 K/UL (ref 0.2–0.8)
MONOCYTES NFR BLD: 8.7 %
NEUTROPHILS # BLD: 5.5 K/UL (ref 1.4–6.5)
NEUTS SEG NFR BLD: 71 %
PLATELET # BLD AUTO: 223 K/UL (ref 130–400)
POTASSIUM SERPL-SCNC: 3.8 MEQ/L (ref 3.4–4.9)
PROT SERPL-MCNC: 7.5 G/DL (ref 6.3–8)
RBC # BLD AUTO: 4.22 M/UL (ref 4.2–5.4)
SODIUM SERPL-SCNC: 138 MEQ/L (ref 135–144)
TSH REFLEX: 0.84 UIU/ML (ref 0.44–3.86)
WBC # BLD AUTO: 7.8 K/UL (ref 4.8–10.8)

## 2025-08-11 PROCEDURE — 99214 OFFICE O/P EST MOD 30 MIN: CPT | Performed by: STUDENT IN AN ORGANIZED HEALTH CARE EDUCATION/TRAINING PROGRAM

## 2025-08-11 PROCEDURE — 3075F SYST BP GE 130 - 139MM HG: CPT | Performed by: STUDENT IN AN ORGANIZED HEALTH CARE EDUCATION/TRAINING PROGRAM

## 2025-08-11 PROCEDURE — 3079F DIAST BP 80-89 MM HG: CPT | Performed by: STUDENT IN AN ORGANIZED HEALTH CARE EDUCATION/TRAINING PROGRAM

## 2025-08-12 LAB
FERRITIN: 48 NG/ML
FOLATE: 10.6 NG/ML (ref 4.8–24.2)
VITAMIN B-12: >2000 PG/ML (ref 232–1245)